# Patient Record
Sex: FEMALE | Race: WHITE | NOT HISPANIC OR LATINO | Employment: PART TIME | URBAN - METROPOLITAN AREA
[De-identification: names, ages, dates, MRNs, and addresses within clinical notes are randomized per-mention and may not be internally consistent; named-entity substitution may affect disease eponyms.]

---

## 2018-02-27 ENCOUNTER — OFFICE VISIT (OUTPATIENT)
Dept: URGENT CARE | Facility: CLINIC | Age: 68
End: 2018-02-27
Payer: MEDICARE

## 2018-02-27 VITALS
BODY MASS INDEX: 24.43 KG/M2 | HEART RATE: 90 BPM | WEIGHT: 146.61 LBS | HEIGHT: 65 IN | SYSTOLIC BLOOD PRESSURE: 146 MMHG | OXYGEN SATURATION: 97 % | RESPIRATION RATE: 16 BRPM | DIASTOLIC BLOOD PRESSURE: 80 MMHG | TEMPERATURE: 98.8 F

## 2018-02-27 DIAGNOSIS — R05.9 COUGH: ICD-10-CM

## 2018-02-27 DIAGNOSIS — Z87.19 HISTORY OF HIATAL HERNIA: ICD-10-CM

## 2018-02-27 DIAGNOSIS — K21.9 GASTROESOPHAGEAL REFLUX DISEASE, ESOPHAGITIS PRESENCE NOT SPECIFIED: ICD-10-CM

## 2018-02-27 DIAGNOSIS — Z87.09 HISTORY OF REACTIVE AIRWAY DISEASE: ICD-10-CM

## 2018-02-27 PROCEDURE — 99203 OFFICE O/P NEW LOW 30 MIN: CPT | Performed by: PHYSICIAN ASSISTANT

## 2018-02-27 RX ORDER — ALBUTEROL SULFATE 90 UG/1
2 AEROSOL, METERED RESPIRATORY (INHALATION) EVERY 6 HOURS PRN
Qty: 8.5 G | Refills: 0 | Status: SHIPPED | OUTPATIENT
Start: 2018-02-27

## 2018-03-03 ASSESSMENT — ENCOUNTER SYMPTOMS
FATIGUE: 0
SHORTNESS OF BREATH: 0
MYALGIAS: 1
SORE THROAT: 0
WEIGHT LOSS: 0
EYE REDNESS: 0
NECK PAIN: 0
HEARTBURN: 1
PALPITATIONS: 0
ABDOMINAL PAIN: 0
SPUTUM PRODUCTION: 0
DIARRHEA: 0
FEVER: 0
WHEEZING: 1
CHILLS: 1
EYE DISCHARGE: 0
HEADACHES: 0
NAUSEA: 0
COUGH: 1
VOMITING: 0
TINGLING: 0
ORTHOPNEA: 0
DIZZINESS: 0

## 2018-03-03 NOTE — PROGRESS NOTES
"Subjective:      Linda Montalvo is a 67 y.o. female who presents with Gastrophageal Reflux; Hernia; Shortness of Breath; Generalized Body Aches; and Cough ([id number], chest congestion)            Pt is 68 y/o female who presents today with recent dry cough, and chest congestion, reflux and intermittent body aches. Patient reports that her cough started 2 days ago however she is long history of reflux worsened by her hiatal hernia. Patient reports that she drink a significant amount of vitamin C and suspects that this is caused her worsening heartburn. She reports that this also makes her Cough a little worse.   She denies any fevers, however \"feels like she's getting sick\". She also reports hx of reactive airway- needing to be on rescue inhalers in the past- she currently does not have one.       Gastrophageal Reflux   She complains of coughing, heartburn and wheezing. She reports no abdominal pain, no chest pain, no nausea, no sore throat or no tooth decay. The symptoms are aggravated by certain foods, caffeine, medications and tight clothes. Pertinent negatives include no fatigue, orthopnea or weight loss. Risk factors include hiatal hernia. Past procedures include an EGD.       Review of Systems   Constitutional: Positive for chills and malaise/fatigue. Negative for fatigue, fever and weight loss.   HENT: Negative for congestion, ear discharge, ear pain and sore throat.    Eyes: Negative for discharge and redness.   Respiratory: Positive for cough and wheezing. Negative for sputum production and shortness of breath.    Cardiovascular: Negative for chest pain, palpitations and leg swelling.   Gastrointestinal: Positive for heartburn. Negative for abdominal pain, diarrhea, nausea and vomiting.   Genitourinary: Negative for dysuria and urgency.   Musculoskeletal: Positive for myalgias. Negative for neck pain.   Skin: Negative for itching and rash.   Neurological: Negative for dizziness, tingling and headaches. " "         Objective:     /80   Pulse 90   Temp 37.1 °C (98.8 °F)   Resp 16   Ht 1.651 m (5' 5\")   Wt 66.5 kg (146 lb 9.7 oz)   SpO2 97%   BMI 24.40 kg/m²    PMH:  has no past medical history on file.  MEDS:   Current Outpatient Prescriptions:   •  ALBUTEROL INH, Inhale  by mouth., Disp: , Rfl:   •  albuterol 108 (90 Base) MCG/ACT Aero Soln inhalation aerosol, Inhale 2 Puffs by mouth every 6 hours as needed for Shortness of Breath., Disp: 8.5 g, Rfl: 0  ALLERGIES:   Allergies   Allergen Reactions   • Botox [Botulinum Toxin Type A, Cosm] Unspecified     Acid reflux     SURGHX: No past surgical history on file.  SOCHX:  reports that she has never smoked. She has never used smokeless tobacco.  FH: Family history was reviewed, no pertinent findings to report    Physical Exam   Constitutional: She is oriented to person, place, and time. She appears well-developed and well-nourished.   HENT:   Head: Normocephalic and atraumatic.   Mouth/Throat: No oropharyngeal exudate.   Ears- Canals clear- TM- with clear fluid effusions bilaterally.   Pos. PND, with slight erythema- without tonsillar edema or exudate.   Mild discharge noted bilaterally- to nares.      Eyes: EOM are normal. Pupils are equal, round, and reactive to light.   Neck: Normal range of motion. Neck supple.   Cardiovascular: Normal rate and regular rhythm.    No murmur heard.  Pulmonary/Chest: Effort normal and breath sounds normal. No respiratory distress. She exhibits no tenderness.   Abdominal: Soft. Bowel sounds are normal. She exhibits no distension. There is no tenderness.   Musculoskeletal: Normal range of motion. She exhibits no tenderness.   Lymphadenopathy:     She has no cervical adenopathy.   Neurological: She is alert and oriented to person, place, and time.   Skin: Skin is warm. No rash noted.   Psychiatric: She has a normal mood and affect. Her behavior is normal.   Vitals reviewed.              Assessment/Plan:     1. History of hiatal " hernia  2. Gastroesophageal reflux disease, esophagitis presence not specified  3. Cough  - albuterol 108 (90 Base) MCG/ACT Aero Soln inhalation aerosol; Inhale 2 Puffs by mouth every 6 hours as needed for Shortness of Breath.  Dispense: 8.5 g; Refill: 0    4. History of reactive airway disease  - albuterol 108 (90 Base) MCG/ACT Aero Soln inhalation aerosol; Inhale 2 Puffs by mouth every 6 hours as needed for Shortness of Breath.  Dispense: 8.5 g; Refill: 0    Recheck after GI cocktail- small improvement of symptoms- Diet changes were discussed. It was explained to the pt. Today that due to the viral nature of the pt's illness, we will treat symptomatically today. Encouraged OTC supportive meds PRN. Humidification, increase fluids, avoid night time dairy.   Inhaler was written for this patient.   Patient given precautionary s/sx that mandate immediate follow up and evaluation in the ED. Advised of risks of not doing so.    DDX, Supportive care, and indications for immediate follow-up discussed with patient.    Instructed to return to clinic or nearest emergency department if we are not available for any change in condition, further concerns, or worsening of symptoms.    The patient demonstrated a good understanding and agreed with the treatment plan.

## 2018-03-18 ENCOUNTER — HOSPITAL ENCOUNTER (EMERGENCY)
Facility: MEDICAL CENTER | Age: 68
End: 2018-03-18
Attending: EMERGENCY MEDICINE
Payer: MEDICARE

## 2018-03-18 VITALS
SYSTOLIC BLOOD PRESSURE: 166 MMHG | HEIGHT: 65 IN | HEART RATE: 80 BPM | BODY MASS INDEX: 24.61 KG/M2 | WEIGHT: 147.71 LBS | OXYGEN SATURATION: 95 % | TEMPERATURE: 98.1 F | RESPIRATION RATE: 16 BRPM | DIASTOLIC BLOOD PRESSURE: 73 MMHG

## 2018-03-18 DIAGNOSIS — S05.01XA ABRASION OF RIGHT CORNEA, INITIAL ENCOUNTER: ICD-10-CM

## 2018-03-18 DIAGNOSIS — S05.02XA ABRASION OF LEFT CORNEA, INITIAL ENCOUNTER: ICD-10-CM

## 2018-03-18 PROCEDURE — 99283 EMERGENCY DEPT VISIT LOW MDM: CPT

## 2018-03-18 RX ORDER — PROPARACAINE HYDROCHLORIDE 5 MG/ML
SOLUTION/ DROPS OPHTHALMIC
Status: DISCONTINUED
Start: 2018-03-18 | End: 2018-03-18 | Stop reason: HOSPADM

## 2018-03-18 ASSESSMENT — PAIN SCALES - GENERAL: PAINLEVEL_OUTOF10: 10

## 2018-03-18 NOTE — ED PROVIDER NOTES
ED Provider Note    CHIEF COMPLAINT  Chief Complaint   Patient presents with   • Eye Pain     both eyes.        HPI  Linda Montalvo is a 67 y.o. female who presents to ER for evaluation of bilateral eye pain and irritation.  The patient states that her eyes felt fine yesterday.  She has been sleeping a lot of dust in her house because there are no remodeling.  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 that she had not worn or changes solution in in a year.  They were uncomfortable immediately, but she left them on for couple of hours.  They're somewhat afterwards missed today she woke up feeling a foreign body sensation of sand in her eyes.  Also, was of decreased vision.  Denies any previous eye pain or trauma.  No bright light exposures.  No other traumas or chemicals.  No other acute concerns or complaints.  Does not wear contacts normally.  No glaucoma.    REVIEW OF SYSTEMS  See HPI for further details. All other systems are negative.    PAST MEDICAL HISTORY  No past medical history on file.    FAMILY HISTORY  No family history on file.    SOCIAL HISTORY  Social History     Social History   • Marital status:      Spouse name: N/A   • Number of children: N/A   • Years of education: N/A     Social History Main Topics   • Smoking status: Never Smoker   • Smokeless tobacco: Never Used   • Alcohol use Not on file   • Drug use: Unknown   • Sexual activity: Not on file     Other Topics Concern   • Not on file     Social History Narrative   • No narrative on file       SURGICAL HISTORY  No past surgical history on file.    CURRENT MEDICATIONS  Home Medications     Reviewed by Carol Perera (Pharmacy Tech) on 03/18/18 at 0838  Med List Status: Complete   Medication Last Dose Status   albuterol 108 (90 Base) MCG/ACT Aero Soln inhalation aerosol 3/18/2018 Active   Non Formulary Request 3/17/2018 Active                ALLERGIES  Allergies   Allergen Reactions   • Botox [Botulinum Toxin  "Type A, Cosm] Anaphylaxis     Acid reflux       PHYSICAL EXAM  VITAL SIGNS: BP (!) 166/73   Pulse 80   Temp 36.7 °C (98.1 °F)   Resp 16   Ht 1.651 m (5' 5\")   Wt 67 kg (147 lb 11.3 oz)   SpO2 95%   BMI 24.58 kg/m²    Constitutional: Awake, alert, anxious, moderate distress from discomfort.  Eyes are closed and tearing  HENT: Normocephalic, Atraumatic, Bilateral external ears normal, Oropharynx moist, No oral exudates, Nose normal.   Eyes: PERRL, EOMI, both eyes are red and injected and angry appearing.  Anterior chambers are clear.  Tonometry was performed on both eyes and the pressure was 15 in each eye with less than 5% error.  4.  Seen exam was remarkable for stippling across both corneas without any dendritic or obvious abrasion just a diffuse irregular cornea.  Cornea showed the same thing under white light.  There is no positive hypopyon or hyphema.  Pupils are briskly reactive.  There is no foreign body visualized.  Visual acuity was difficult to obtain.  The patient had complete resolution of her pain.  Proparacaine could see the clock by them but could not read the numbers.  Neck: Normal range of motion, No tenderness, Supple, No stridor.   Lymphatic: No lymphadenopathy noted.   Cardiovascular: Normal heart rate, Normal rhythm, No murmurs, No rubs, No gallops.   Thorax & Lungs: Normal breath sounds, No respiratory distress, No wheezing,  Abdomen: Bowel sounds normal, Soft, No tenderness,    Musculoskeletal: Good range of motion in all major joints.   Neurologic: Alert & oriented x 3, No focal deficits noted.   Psychiatric: Affect normal,      COURSE & MEDICAL DECISION MAKING  Pertinent Labs & Imaging studies reviewed. (See chart for details)  The patient was seen and examined her eyes examined.  She had a detailed eye examination which shows a very diffuse irritation and irregularity to the cornea.  I suspect this is related to the contact lens use.    After her exam, I spoke with Dr. Perkins on-call " for ophthalmology who will see her in the office at this time.  The patient is given directions and the address and phone number for the ophthalmologist.  He has their contact and the patient is sent to see the ophthalmologist at this time.    The patient was noted to have elevated blood pressure while in the ER and was counseled to see their doctor within one wee to have this rechecked.    Ronni Enamorado M.D.  [address]  [address]  [facility] [address]  [phone]    Schedule an appointment as soon as possible for a visit          FINAL IMPRESSION  1. Abrasion of left cornea, initial encounter    2. Abrasion of right cornea, initial encounter          2.   3.         Electronically signed by: James Gary, 3/18/2018 9:21 AM

## 2018-03-18 NOTE — ED NOTES
Med rec updated and complete  Allergies reviewed  Pt reports no antibiotics in the last 30 days.

## 2018-03-18 NOTE — ED NOTES
Discharged in good condition with follow up instructions, pt verbalizes understanding of all, ambulates out with steady gait accompanied by .  Pt will go directly to eye Dr from ER.

## 2018-03-18 NOTE — ED NOTES
Patient is living in a house that has major remodeling occurring. There is a large amount of dust in house from drywall ect. Last night she put her contacts in and the pain became severe and vision was white and fuzzy.

## 2018-03-18 NOTE — DISCHARGE INSTRUCTIONS
Do not drive until cleared by your doctor.  Follow-up with ophthalmologist right now.        Corneal Abrasion  The cornea is the clear covering at the front and center of the eye. When you look at the colored portion of the eye, you are looking through the cornea. It is a thin tissue made up of layers. The top layer is the most sensitive layer. A corneal abrasion happens if this layer is scratched or an injury causes it to come off.   HOME CARE  · You may be given drops or a medicated cream. Use the medicine as told by your doctor.  · A pressure patch may be put over the eye. If this is done, follow your doctor's instructions for when to remove the patch. Do not drive or use machines while the eye patch is on. Judging distances is hard to do with a patch on.  · See your doctor for a follow-up exam if you are told to do so. It is very important that you keep this appointment.  GET HELP IF:   · You have pain, are sensitive to light, and have a scratchy feeling in one eye or both eyes.  · Your pressure patch keeps getting loose. You can blink your eye under the patch.  · You have fluid coming from your eye or the lids stick together in the morning.  · You have the same symptoms in the morning that you did with the first abrasion. This could be days, weeks, or months after the first abrasion healed.  This information is not intended to replace advice given to you by your health care provider. Make sure you discuss any questions you have with your health care provider.  Document Released: 06/05/2009 Document Revised: 09/07/2016 Document Reviewed: 08/25/2014  Elsevier Interactive Patient Education © 2017 [employer]

## 2018-11-15 ENCOUNTER — OFFICE VISIT (OUTPATIENT)
Dept: URGENT CARE | Facility: CLINIC | Age: 68
End: 2018-11-15
Payer: MEDICARE

## 2018-11-15 VITALS
TEMPERATURE: 97.4 F | HEART RATE: 86 BPM | HEIGHT: 65 IN | WEIGHT: 149.4 LBS | RESPIRATION RATE: 16 BRPM | OXYGEN SATURATION: 95 % | DIASTOLIC BLOOD PRESSURE: 80 MMHG | SYSTOLIC BLOOD PRESSURE: 120 MMHG | BODY MASS INDEX: 24.89 KG/M2

## 2018-11-15 DIAGNOSIS — J02.9 SORE THROAT: ICD-10-CM

## 2018-11-15 DIAGNOSIS — J06.9 UPPER RESPIRATORY TRACT INFECTION, UNSPECIFIED TYPE: ICD-10-CM

## 2018-11-15 DIAGNOSIS — J02.9 ACUTE PHARYNGITIS, UNSPECIFIED ETIOLOGY: Primary | ICD-10-CM

## 2018-11-15 LAB
INT CON NEG: NORMAL
INT CON POS: NORMAL
S PYO AG THROAT QL: NORMAL

## 2018-11-15 PROCEDURE — 87880 STREP A ASSAY W/OPTIC: CPT | Performed by: NURSE PRACTITIONER

## 2018-11-15 PROCEDURE — 99214 OFFICE O/P EST MOD 30 MIN: CPT | Performed by: NURSE PRACTITIONER

## 2018-11-15 RX ORDER — AZITHROMYCIN 250 MG/1
TABLET, FILM COATED ORAL
Qty: 6 TAB | Refills: 0 | Status: SHIPPED | OUTPATIENT
Start: 2018-11-15

## 2018-11-15 RX ORDER — METHYLPREDNISOLONE 4 MG/1
TABLET ORAL
Qty: 1 KIT | Refills: 0 | Status: SHIPPED | OUTPATIENT
Start: 2018-11-15 | End: 2022-02-15

## 2018-11-15 ASSESSMENT — ENCOUNTER SYMPTOMS
CHILLS: 1
HEADACHES: 0
SORE THROAT: 1
NEUROLOGICAL NEGATIVE: 1
COUGH: 1
SHORTNESS OF BREATH: 0
CARDIOVASCULAR NEGATIVE: 1
EYES NEGATIVE: 1

## 2018-11-16 NOTE — PROGRESS NOTES
Subjective:   Linda Montalvo is a 68 y.o. female who presents for Pharyngitis (x 2 days, sore throat, pain to swallow, ear pain and little chest tigthness)        Pharyngitis    This is a new problem. Episode onset: 2 days ago. The problem has been gradually worsening. Maximum temperature: reports feeling hot. The pain is severe. Associated symptoms include congestion, coughing and ear pain. Pertinent negatives include no headaches or shortness of breath. She has had exposure to strep.     Review of Systems   Constitutional: Positive for chills and malaise/fatigue.   HENT: Positive for congestion, ear pain and sore throat.    Eyes: Negative.    Respiratory: Positive for cough. Negative for shortness of breath.    Cardiovascular: Negative.  Negative for chest pain.   Neurological: Negative.  Negative for headaches.   All other systems reviewed and are negative.      PMH:  has no past medical history on file.    MEDS:   Current Outpatient Prescriptions:   •  azithromycin (ZITHROMAX) 250 MG Tab, Take 2 tabs by mouth once today, then one tab by mouth once daily days 2-5. Complete all medication., Disp: 6 Tab, Rfl: 0  •  MethylPREDNISolone (MEDROL DOSEPAK) 4 MG Tablet Therapy Pack, Take as directed on packaging., Disp: 1 Kit, Rfl: 0  •  albuterol 108 (90 Base) MCG/ACT Aero Soln inhalation aerosol, Inhale 2 Puffs by mouth every 6 hours as needed for Shortness of Breath., Disp: 8.5 g, Rfl: 0  •  Non Formulary Request, Apply 1 Application to affected area(s) every evening. Bio identical Progesterone Cream, Disp: , Rfl:     ALLERGIES:   Allergies   Allergen Reactions   • Botox [Botulinum Toxin Type A, Cosm] Anaphylaxis     Acid reflux     SURGHX: No past surgical history on file.    SOCHX:  reports that she has never smoked. She has never used smokeless tobacco.    FH: family history is not on file.     Objective:   /80 (BP Location: Left arm, Patient Position: Sitting, BP Cuff Size: Adult)   Pulse 86   Temp 36.3  "°C (97.4 °F) (Temporal)   Resp 16   Ht 1.651 m (5' 5\")   Wt 67.8 kg (149 lb 6.4 oz)   SpO2 95%   BMI 24.86 kg/m²      Physical Exam   Constitutional: She is oriented to person, place, and time. She appears well-developed and well-nourished.   HENT:   Head: Normocephalic and atraumatic.   Right Ear: Tympanic membrane and external ear normal.   Left Ear: Tympanic membrane and external ear normal.   Nose: Nose normal. Right sinus exhibits no maxillary sinus tenderness and no frontal sinus tenderness. Left sinus exhibits no maxillary sinus tenderness and no frontal sinus tenderness.   Mouth/Throat: Uvula is midline and mucous membranes are normal. Posterior oropharyngeal edema and posterior oropharyngeal erythema present. No oropharyngeal exudate or tonsillar abscesses. Tonsils are 2+ on the right. Tonsils are 2+ on the left. No tonsillar exudate.   Eyes: Pupils are equal, round, and reactive to light. Conjunctivae and EOM are normal.   Neck: Normal range of motion.   Cardiovascular: Normal rate, regular rhythm, normal heart sounds and intact distal pulses.    Pulmonary/Chest: Effort normal and breath sounds normal. No respiratory distress. She has no decreased breath sounds. She has no wheezes. She has no rhonchi. She has no rales.   Abdominal: Soft. Bowel sounds are normal.   Musculoskeletal: Normal range of motion.   Lymphadenopathy:     She has no cervical adenopathy.   Neurological: She is alert and oriented to person, place, and time.   Skin: Skin is warm and dry. Capillary refill takes less than 2 seconds.   Psychiatric: She has a normal mood and affect.   Vitals reviewed.        Assessment/Plan:   1. Acute pharyngitis, unspecified etiology  - azithromycin (ZITHROMAX) 250 MG Tab; Take 2 tabs by mouth once today, then one tab by mouth once daily days 2-5. Complete all medication.  Dispense: 6 Tab; Refill: 0  - MethylPREDNISolone (MEDROL DOSEPAK) 4 MG Tablet Therapy Pack; Take as directed on packaging.  " Dispense: 1 Kit; Refill: 0    2. Upper respiratory tract infection, unspecified type  - azithromycin (ZITHROMAX) 250 MG Tab; Take 2 tabs by mouth once today, then one tab by mouth once daily days 2-5. Complete all medication.  Dispense: 6 Tab; Refill: 0    3. Sore throat  - POCT Rapid Strep A    Rapid strep swab negative, but patient reports her  was recently diagnosed with strep throat and is she concerned about a worsening upper respiratory illness because she is about to travel. Differential diagnosis, natural history, supportive care, and indications for immediate follow-up discussed.    Return for 1) Symptoms that change or worsen, or go to the ER, 2) Follow up with primary care.    All questions answered. Patient agrees with the plan of care.

## 2019-05-01 ENCOUNTER — OFFICE VISIT (OUTPATIENT)
Dept: URGENT CARE | Facility: CLINIC | Age: 69
End: 2019-05-01
Payer: MEDICARE

## 2019-05-01 VITALS
SYSTOLIC BLOOD PRESSURE: 122 MMHG | TEMPERATURE: 98.4 F | DIASTOLIC BLOOD PRESSURE: 60 MMHG | WEIGHT: 136 LBS | OXYGEN SATURATION: 98 % | BODY MASS INDEX: 21.86 KG/M2 | HEIGHT: 66 IN | HEART RATE: 74 BPM | RESPIRATION RATE: 16 BRPM

## 2019-05-01 DIAGNOSIS — J20.9 ACUTE BRONCHITIS, UNSPECIFIED ORGANISM: ICD-10-CM

## 2019-05-01 PROCEDURE — 99214 OFFICE O/P EST MOD 30 MIN: CPT | Performed by: NURSE PRACTITIONER

## 2019-05-01 RX ORDER — DOXYCYCLINE HYCLATE 100 MG
100 TABLET ORAL 2 TIMES DAILY
Qty: 14 TAB | Refills: 0 | Status: SHIPPED | OUTPATIENT
Start: 2019-05-01 | End: 2019-05-08

## 2019-05-01 ASSESSMENT — ENCOUNTER SYMPTOMS
CHILLS: 0
EYE DISCHARGE: 0
DIARRHEA: 0
HEADACHES: 1
SPUTUM PRODUCTION: 1
SHORTNESS OF BREATH: 0
WHEEZING: 0
MYALGIAS: 0
ORTHOPNEA: 0
NAUSEA: 0
FEVER: 0
SORE THROAT: 0
COUGH: 1

## 2019-05-01 NOTE — PROGRESS NOTES
Subjective:      Linda Montalvo is a 68 y.o. female who presents with Cough (x2weeks, cough, chest congestion, hard to breathe, ear congestion, headache)            HPI New. 68 year old female with cough and chdest congestion for 2 weeks. She has associated ear congestion, headache and shortness of breath. She denies any wheezing. She has no fever, chills, sore throat or body aches. Taking her albuterol (GERD) and otc medications.  Botox [botulinum toxin type a, cosm]  Current Outpatient Prescriptions on File Prior to Visit   Medication Sig Dispense Refill   • albuterol 108 (90 Base) MCG/ACT Aero Soln inhalation aerosol Inhale 2 Puffs by mouth every 6 hours as needed for Shortness of Breath. 8.5 g 0   • azithromycin (ZITHROMAX) 250 MG Tab Take 2 tabs by mouth once today, then one tab by mouth once daily days 2-5. Complete all medication. (Patient not taking: Reported on 5/1/2019) 6 Tab 0   • MethylPREDNISolone (MEDROL DOSEPAK) 4 MG Tablet Therapy Pack Take as directed on packaging. (Patient not taking: Reported on 5/1/2019) 1 Kit 0   • Non Formulary Request Apply 1 Application to affected area(s) every evening. Bio identical Progesterone Cream       No current facility-administered medications on file prior to visit.      Social History     Social History   • Marital status:      Spouse name: N/A   • Number of children: N/A   • Years of education: N/A     Occupational History   • Not on file.     Social History Main Topics   • Smoking status: Never Smoker   • Smokeless tobacco: Never Used   • Alcohol use Not on file   • Drug use: Unknown   • Sexual activity: Not on file     Other Topics Concern   • Not on file     Social History Narrative   • No narrative on file     family history is not on file.      Review of Systems   Constitutional: Positive for malaise/fatigue. Negative for chills and fever.   HENT: Positive for ear pain. Negative for congestion and sore throat.    Eyes: Negative for discharge.  "  Respiratory: Positive for cough and sputum production. Negative for shortness of breath and wheezing.    Cardiovascular: Negative for chest pain and orthopnea.   Gastrointestinal: Negative for diarrhea and nausea.   Musculoskeletal: Negative for myalgias.   Neurological: Positive for headaches.   Endo/Heme/Allergies: Negative for environmental allergies.          Objective:     /60 (BP Location: Left arm, Patient Position: Sitting, BP Cuff Size: Adult)   Pulse 74   Temp 36.9 °C (98.4 °F) (Temporal)   Resp 16   Ht 1.664 m (5' 5.5\")   Wt 61.7 kg (136 lb)   SpO2 98%   BMI 22.29 kg/m²      Physical Exam   Constitutional: She is oriented to person, place, and time. She appears well-developed and well-nourished. No distress.   HENT:   Head: Normocephalic and atraumatic.   Right Ear: External ear and ear canal normal. Tympanic membrane is not injected and not perforated. No middle ear effusion.   Left Ear: External ear and ear canal normal. Tympanic membrane is not injected and not perforated.  No middle ear effusion.   Nose: No mucosal edema.   Mouth/Throat: No oropharyngeal exudate or posterior oropharyngeal erythema.   Eyes: Conjunctivae are normal. Right eye exhibits no discharge. Left eye exhibits no discharge.   Neck: Normal range of motion. Neck supple.   Cardiovascular: Normal rate, regular rhythm and normal heart sounds.    No murmur heard.  Pulmonary/Chest: Effort normal and breath sounds normal. No respiratory distress. She has no wheezes. She has no rales.   Musculoskeletal: Normal range of motion.   Normal movement of all 4 extremities.   Lymphadenopathy:     She has no cervical adenopathy.        Right: No supraclavicular adenopathy present.        Left: No supraclavicular adenopathy present.   Neurological: She is alert and oriented to person, place, and time. Gait normal.   Skin: Skin is warm and dry.   Psychiatric: She has a normal mood and affect. Her behavior is normal. Thought content " normal.   Nursing note and vitals reviewed.              Assessment/Plan:     1. Acute bronchitis, unspecified organism  doxycycline (VIBRAMYCIN) 100 MG Tab     Differential diagnosis, natural history, supportive care, and indications for immediate follow-up discussed at length.

## 2020-11-12 ENCOUNTER — APPOINTMENT (OUTPATIENT)
Dept: RADIOLOGY | Facility: MEDICAL CENTER | Age: 70
End: 2020-11-12
Payer: MEDICARE

## 2020-11-12 ENCOUNTER — HOSPITAL ENCOUNTER (EMERGENCY)
Facility: MEDICAL CENTER | Age: 70
End: 2020-11-12
Attending: EMERGENCY MEDICINE
Payer: MEDICARE

## 2020-11-12 VITALS
WEIGHT: 150.79 LBS | BODY MASS INDEX: 25.12 KG/M2 | SYSTOLIC BLOOD PRESSURE: 127 MMHG | OXYGEN SATURATION: 97 % | HEART RATE: 71 BPM | TEMPERATURE: 98.4 F | DIASTOLIC BLOOD PRESSURE: 71 MMHG | HEIGHT: 65 IN | RESPIRATION RATE: 16 BRPM

## 2020-11-12 DIAGNOSIS — R07.9 CHEST PAIN AT REST: ICD-10-CM

## 2020-11-12 LAB
ALBUMIN SERPL BCP-MCNC: 4 G/DL (ref 3.2–4.9)
ALBUMIN/GLOB SERPL: 1.3 G/DL
ALP SERPL-CCNC: 60 U/L (ref 30–99)
ALT SERPL-CCNC: 21 U/L (ref 2–50)
ANION GAP SERPL CALC-SCNC: 11 MMOL/L (ref 7–16)
AST SERPL-CCNC: 22 U/L (ref 12–45)
BASOPHILS # BLD AUTO: 0.5 % (ref 0–1.8)
BASOPHILS # BLD: 0.03 K/UL (ref 0–0.12)
BILIRUB SERPL-MCNC: 0.4 MG/DL (ref 0.1–1.5)
BUN SERPL-MCNC: 16 MG/DL (ref 8–22)
CALCIUM SERPL-MCNC: 9.1 MG/DL (ref 8.4–10.2)
CHLORIDE SERPL-SCNC: 105 MMOL/L (ref 96–112)
CO2 SERPL-SCNC: 25 MMOL/L (ref 20–33)
CREAT SERPL-MCNC: 0.85 MG/DL (ref 0.5–1.4)
EKG IMPRESSION: NORMAL
EOSINOPHIL # BLD AUTO: 0.04 K/UL (ref 0–0.51)
EOSINOPHIL NFR BLD: 0.7 % (ref 0–6.9)
ERYTHROCYTE [DISTWIDTH] IN BLOOD BY AUTOMATED COUNT: 43 FL (ref 35.9–50)
GLOBULIN SER CALC-MCNC: 3.1 G/DL (ref 1.9–3.5)
GLUCOSE SERPL-MCNC: 98 MG/DL (ref 65–99)
HCT VFR BLD AUTO: 43.8 % (ref 37–47)
HGB BLD-MCNC: 14.5 G/DL (ref 12–16)
IMM GRANULOCYTES # BLD AUTO: 0.01 K/UL (ref 0–0.11)
IMM GRANULOCYTES NFR BLD AUTO: 0.2 % (ref 0–0.9)
LYMPHOCYTES # BLD AUTO: 1.4 K/UL (ref 1–4.8)
LYMPHOCYTES NFR BLD: 24.9 % (ref 22–41)
MCH RBC QN AUTO: 30.1 PG (ref 27–33)
MCHC RBC AUTO-ENTMCNC: 33.1 G/DL (ref 33.6–35)
MCV RBC AUTO: 90.9 FL (ref 81.4–97.8)
MONOCYTES # BLD AUTO: 0.41 K/UL (ref 0–0.85)
MONOCYTES NFR BLD AUTO: 7.3 % (ref 0–13.4)
NEUTROPHILS # BLD AUTO: 3.73 K/UL (ref 2–7.15)
NEUTROPHILS NFR BLD: 66.4 % (ref 44–72)
NRBC # BLD AUTO: 0 K/UL
NRBC BLD-RTO: 0 /100 WBC
PLATELET # BLD AUTO: 222 K/UL (ref 164–446)
PMV BLD AUTO: 10.8 FL (ref 9–12.9)
POTASSIUM SERPL-SCNC: 4.6 MMOL/L (ref 3.6–5.5)
PROT SERPL-MCNC: 7.1 G/DL (ref 6–8.2)
RBC # BLD AUTO: 4.82 M/UL (ref 4.2–5.4)
SODIUM SERPL-SCNC: 141 MMOL/L (ref 135–145)
T4 FREE SERPL-MCNC: 1.12 NG/DL (ref 0.93–1.7)
TROPONIN T SERPL-MCNC: 8 NG/L (ref 6–19)
TSH SERPL DL<=0.005 MIU/L-ACNC: 1.17 UIU/ML (ref 0.38–5.33)
WBC # BLD AUTO: 5.6 K/UL (ref 4.8–10.8)

## 2020-11-12 PROCEDURE — 84439 ASSAY OF FREE THYROXINE: CPT

## 2020-11-12 PROCEDURE — 93005 ELECTROCARDIOGRAM TRACING: CPT

## 2020-11-12 PROCEDURE — 84443 ASSAY THYROID STIM HORMONE: CPT

## 2020-11-12 PROCEDURE — 99283 EMERGENCY DEPT VISIT LOW MDM: CPT

## 2020-11-12 PROCEDURE — 85025 COMPLETE CBC W/AUTO DIFF WBC: CPT

## 2020-11-12 PROCEDURE — 71045 X-RAY EXAM CHEST 1 VIEW: CPT

## 2020-11-12 PROCEDURE — 84484 ASSAY OF TROPONIN QUANT: CPT

## 2020-11-12 PROCEDURE — 93005 ELECTROCARDIOGRAM TRACING: CPT | Performed by: EMERGENCY MEDICINE

## 2020-11-12 PROCEDURE — 80053 COMPREHEN METABOLIC PANEL: CPT

## 2020-11-12 NOTE — ED PROVIDER NOTES
"ED Provider Note    CHIEF COMPLAINT  Chief Complaint   Patient presents with   • Sent by MD     Reports chest heaviness and \"tightness in my back\". Reports hx of GERD. Sent here by PCP \"so that I can go to endoscopy next week.        HPI  Linda Montalvo is a 70 y.o. female who presents with a report of chest heaviness and tightness over the last couple of days.  She says that she is going to endoscopy next week.  She is actually had some chest discomfort for many weeks off and on and her doctor thinks she may have gastroesophageal reflux disease.  She states that she has not had any sweats or vomiting or radiation of the pain that seems to be well localized but dull and achy.  Does not radiate to the back.  Denies any leg pain or swelling or prior history of clots.  Denies any fever, chills, sweats or other complaints    REVIEW OF SYSTEMS  See HPI for further details. All other systems are negative.     PAST MEDICAL HISTORY  Past Medical History:   Diagnosis Date   • GERD (gastroesophageal reflux disease)        FAMILY HISTORY  No family history on file.    SOCIAL HISTORY   reports that she has never smoked. She has never used smokeless tobacco.    SURGICAL HISTORY  Past Surgical History:   Procedure Laterality Date   • BREAST RECONSTRUCTION     • PRIMARY C SECTION         CURRENT MEDICATIONS  Home Medications    **Home medications have not yet been reviewed for this encounter**         ALLERGIES  Allergies   Allergen Reactions   • Botox [Botulinum Toxin Type A, Cosm] Anaphylaxis     Acid reflux       PHYSICAL EXAM  VITAL SIGNS: /66   Pulse 74   Temp 37.1 °C (98.8 °F) (Temporal)   Resp 18   Ht 1.651 m (5' 5\")   Wt 68.4 kg (150 lb 12.7 oz)   SpO2 98%   BMI 25.09 kg/m²    Constitutional: Well developed, Well nourished, No acute distress, Non-toxic appearance.   HENT: Normocephalic, Atraumatic, Bilateral external ears normal, Oropharynx is clear mucous membranes are moist. No oral exudates or nasal " discharge.   Eyes: Pupils are equal round and reactive, EOMI, Conjunctiva normal, No discharge.   Neck: Normal range of motion, No tenderness, Supple, No stridor. No meningismus.  Lymphatic: No lymphadenopathy noted.   Cardiovascular: Regular rate and rhythm without murmur rub or gallop.  Thorax & Lungs: Clear breath sounds bilaterally without wheezes, rhonchi or rales. There is no chest wall tenderness.   Abdomen: Soft non-tender non-distended. There is no rebound or guarding. No organomegaly is appreciated. Bowel sounds are normal.  Skin: Normal without rash.   Back: No CVA or spinal tenderness.   Extremities: Intact distal pulses, No edema, No tenderness, No cyanosis, No clubbing. Capillary refill is less than 2 seconds.  Musculoskeletal: Good range of motion in all major joints. No tenderness to palpation or major deformities noted.   Neurologic: Alert & oriented x 3, Normal motor function, Normal sensory function, No focal deficits noted. Reflexes are normal.  Psychiatric: Affect normal, Judgment normal, Mood normal. There is no suicidal ideation or patient reported hallucinations.     EKG  Results for orders placed or performed during the hospital encounter of 20   EKG   Result Value Ref Range    Report       [facility] Emergency Dept.    Test Date:  2020  Pt Name:    FELICE DIAMOND             Department: EDS  MRN:        [id number]                      Room:  Gender:     Female                       Technician: ARNIE  :        1950                   Requested By:ER TRIAGE PROTOCOL  Order #:    [id number]                    Reading MD: MILAGRO SCHWARTZ MD    Measurements  Intervals                                Axis  Rate:       74                           P:          66  NC:         184                          QRS:        66  QRSD:       86                           T:          58  QT:         392  QTc:        435    Interpretive Statements  SINUS RHYTHM  LOW VOLTAGE  IN FRONTAL LEADS  No previous ECG available for comparison  Electronically Signed On 11- 13:32:28 PST by MILAGRO SCHWARTZ MD           RADIOLOGY/PROCEDURES  DX-CHEST-PORTABLE (1 VIEW)   Final Result      No radiographic evidence of acute cardiopulmonary process.            COURSE & MEDICAL DECISION MAKING  Pertinent Labs & Imaging studies reviewed. (See chart for details)  The patient had an EKG performed that shows no ischemic changes or dysrhythmia.    I have a low sense of coronavirus and did not perform screening test.  I am concerned about ACS versus esophagitis or GERD.  Less likely aortic abnormality.    Chest x-ray shows no acute mass, edema, cardiomegaly    Laboratory evaluation shows no leukocytosis, shift, anemia, electrolyte derangements or acidosis and troponin is normal and has good negative predictive value.  Heart score is 1    The patient has a very low risk of ACS and I am discharging her with follow-up to her primary doctor.  I have added TSH and free T4 to her lab regimen which is pending.  She has had some weight gain of 20 pounds and malaise and she can follow-up with testing with her doctor and be started on medication as appropriate.    Patient is discharged in stable condition understands her plan of care will return if any significant change in symptoms    FINAL IMPRESSION  1. Chest pain at rest             Electronically signed by: Milagro Schwartz M.D., 11/12/2020 1:28 PM

## 2021-03-03 ENCOUNTER — PHARMACY VISIT (OUTPATIENT)
Dept: PHARMACY | Facility: MEDICAL CENTER | Age: 71
End: 2021-03-03
Payer: COMMERCIAL

## 2021-03-03 PROCEDURE — RXMED WILLOW AMBULATORY MEDICATION CHARGE: Performed by: SURGERY

## 2021-03-03 RX ORDER — OXYCODONE HCL 5 MG/5 ML
SOLUTION, ORAL ORAL
Qty: 120 ML | Refills: 0 | OUTPATIENT
Start: 2021-03-03

## 2022-02-15 ENCOUNTER — HOSPITAL ENCOUNTER (EMERGENCY)
Facility: MEDICAL CENTER | Age: 72
End: 2022-02-15
Attending: EMERGENCY MEDICINE
Payer: MEDICARE

## 2022-02-15 VITALS
OXYGEN SATURATION: 96 % | WEIGHT: 145.72 LBS | HEART RATE: 67 BPM | RESPIRATION RATE: 16 BRPM | DIASTOLIC BLOOD PRESSURE: 69 MMHG | HEIGHT: 65 IN | SYSTOLIC BLOOD PRESSURE: 165 MMHG | TEMPERATURE: 97.8 F | BODY MASS INDEX: 24.28 KG/M2

## 2022-02-15 DIAGNOSIS — R21 RASH: ICD-10-CM

## 2022-02-15 LAB — TREPONEMA PALLIDUM IGG+IGM AB [PRESENCE] IN SERUM OR PLASMA BY IMMUNOASSAY: NORMAL

## 2022-02-15 PROCEDURE — 99283 EMERGENCY DEPT VISIT LOW MDM: CPT

## 2022-02-15 PROCEDURE — 86780 TREPONEMA PALLIDUM: CPT

## 2022-02-15 PROCEDURE — 36415 COLL VENOUS BLD VENIPUNCTURE: CPT

## 2022-02-15 RX ORDER — DEXAMETHASONE SODIUM PHOSPHATE 4 MG/ML
10 INJECTION, SOLUTION INTRA-ARTICULAR; INTRALESIONAL; INTRAMUSCULAR; INTRAVENOUS; SOFT TISSUE ONCE
Status: COMPLETED | OUTPATIENT
Start: 2022-02-15 | End: 2022-02-15

## 2022-02-15 RX ORDER — METHYLPREDNISOLONE 4 MG/1
TABLET ORAL
Qty: 1 EACH | Refills: 0 | Status: SHIPPED | OUTPATIENT
Start: 2022-02-15

## 2022-02-15 RX ORDER — HYDROXYZINE PAMOATE 25 MG/1
25 CAPSULE ORAL 3 TIMES DAILY PRN
Qty: 60 CAPSULE | Refills: 0 | Status: SHIPPED | OUTPATIENT
Start: 2022-02-15

## 2022-02-15 ASSESSMENT — FIBROSIS 4 INDEX: FIB4 SCORE: 1.49

## 2022-02-15 NOTE — ED TRIAGE NOTES
"Chief Complaint   Patient presents with   • Rash     Pt c/o full body rash for three months, states has followed up w/ Dermatologist and has tried several different treatments without relief     BP (!) 164/77   Pulse 90   Temp 36.5 °C (97.7 °F) (Temporal)   Resp 15   Ht 1.651 m (5' 5\")   Wt 66.1 kg (145 lb 11.6 oz)   SpO2 96%   BMI 24.25 kg/m²     Has this patient been vaccinated for COVID NO  If not, would they like to be vaccinated while in the ER if eligible?  no  Would the patient like to speak with the ERP about the possibility of receiving the COVID vaccine today before making a decision? No    Pt ambulated to ED w/ visitor for c/o generalized rash ongoing for three months.  Pt states is no longer able to handle the itching, states medications are not helping.    "

## 2022-02-15 NOTE — ED NOTES
Pt in gown, sitting on gurney.  Pt reports ongoing rash.  Pt has been seen by her PCP and a dermatologist.  Pt has copies of outpatient labs to share with ERP.  Pt reports pain 10/10.  Chart up for ERP.

## 2022-02-15 NOTE — DISCHARGE INSTRUCTIONS
Fortunately the exact cause of your rash is unclear.  I would continue with the medications written by your dermatologist and regular doctor.  Lets add the steroids.  The Vistaril is for itching but will make you drowsy.  Do not drive and take this medication.  If you develop a fever, have any change in the rash or any other concerns return.  I hope you feel better soon.

## 2022-02-15 NOTE — ED NOTES
Vital signs repeated.  Pt requesting initial dose of steroids to be given as an injection.  ERP made aware.

## 2022-02-15 NOTE — ED PROVIDER NOTES
ED Provider Note  CHIEF COMPLAINT  Chief Complaint   Patient presents with   • Rash     Pt c/o full body rash for three months, states has followed up w/ Dermatologist and has tried several different treatments without relief       HPI  Linda Montalvo is a 71 y.o. female who presents with a skin rash.  Patient has had a full body skin rash for the last 3 months.  Patient has been seen by her primary care doctor as well as a dermatologist.  She has had a work-up for a immune issue as well as skin biopsies.  Patient is currently taking ivermectin, doxycycline and triamcinolone cream without relief of her symptoms.  She presents today because she had discussed trying steroids with her primary care doctor and would like to start the steroids today.  Patient does not have any lesions in her mouth.  No lesions to her palms.  The rash did not start after travel.  The rash is extremely itchy.  It involves her scalp back and extremities.  She has no tongue swelling or difficulty breathing.  She has not had a recent illness but specialist think this is viral in etiology.  She not had any recent change in the rash.    REVIEW OF SYSTEMS  Positive for skin rash and itching, Negative for lesions in the mouth, tongue swelling, shortness of breath, fevers, swelling in the extremities, numbness coolness or weakness, nausea or vomiting.    PAST MEDICAL HISTORY   has a past medical history of Chronic back pain, COVID, and GERD (gastroesophageal reflux disease).    SOCIAL HISTORY  Social History     Tobacco Use   • Smoking status: Never Smoker   • Smokeless tobacco: Never Used   Vaping Use   • Vaping Use: Unknown   Substance and Sexual Activity   • Alcohol use: Yes   • Drug use: Not Currently   • Sexual activity: Not on file       SURGICAL HISTORY   has a past surgical history that includes primary c section and breast reconstruction.    CURRENT MEDICATIONS  Reviewed.  See Encounter Summary.      ALLERGIES  Allergies   Allergen  "Reactions   • Botox [Botulinum Toxin Type A, Cosm] Anaphylaxis     Acid reflux       PHYSICAL EXAM  VITAL SIGNS: BP (!) 164/77   Pulse 90   Temp 36.5 °C (97.7 °F) (Temporal)   Resp 15   Ht 1.651 m (5' 5\")   Wt 66.1 kg (145 lb 11.6 oz)   SpO2 96%   BMI 24.25 kg/m²   Constitutional: Alert in no apparent distress.  HENT: Normocephalic, Bilateral external ears normal. Nose normal.   Eyes: Pupils are equal and reactive. Conjunctiva normal, non-icteric.   Thorax & Lungs: Easy unlabored respirations  Abdomen:  No gross signs of peritonitis, no pain with movement   Skin: Visualized skin is  Dry, No erythema, there is a diffuse rash that is macular papular and erythematous to the extremities and back.  There is no scaling.  There is no vesicles.  There is no petechiae.  There is no involvement of the mucosa or palms or feet.  No evidence of a secondary infection.  Extremities:   No edema, No asymmetry  Neurologic: Alert, Grossly non-focal.   Psychiatric: Affect and Mood normal      COURSE & MEDICAL DECISION MAKING  Nursing notes and vital signs were reviewed. (See chart for details)    The patient presents to the Emergency Department with a skin rash of unclear etiology.  Has had skin biopsies and a work-up for rheumatoid etiology without diagnosis.  No involvement of the palms or mucosal surfaces.  Since the rash has been present for 3 months I am not concerned about an acute life-threatening skin rash.  I will check a syphilis test although I think likelihood is remote.  I will call her with those results if they are positive.  She requests steroids and I think this is a good option to try.  She not having any airway issues.  She will follow up with her primary care doctor.  I have also written for Vistaril to help her sleep at night and for the itching.  Skin rash precautions were given.         The patient verbally agreed to the discharge precautions and follow-up plan which is documented in EPIC.    FINAL " IMPRESSION  1. Rash  methylPREDNISolone (MEDROL DOSEPAK) 4 MG Tablet Therapy Pack    hydrOXYzine pamoate (VISTARIL) 25 MG Cap

## 2022-02-15 NOTE — ED NOTES
Discharge instructions given to pt with verbalized understanding. Pt ambulatory out of the ER with her .

## 2022-02-28 ENCOUNTER — APPOINTMENT (RX ONLY)
Dept: URBAN - METROPOLITAN AREA CLINIC 6 | Facility: CLINIC | Age: 72
Setting detail: DERMATOLOGY
End: 2022-02-28

## 2022-02-28 DIAGNOSIS — L30.9 DERMATITIS, UNSPECIFIED: ICD-10-CM

## 2022-02-28 PROCEDURE — ? PRESCRIPTION

## 2022-02-28 PROCEDURE — 99204 OFFICE O/P NEW MOD 45 MIN: CPT

## 2022-02-28 PROCEDURE — ? PHOTO-DOCUMENTATION

## 2022-02-28 PROCEDURE — ? DIAGNOSIS COMMENT

## 2022-02-28 PROCEDURE — ? COUNSELING

## 2022-02-28 RX ORDER — TRIAMCINOLONE ACETONIDE 1 MG/G
1 CREAM TOPICAL BID
Qty: 454 | Refills: 2 | Status: ERX | COMMUNITY
Start: 2022-02-28

## 2022-02-28 RX ADMIN — TRIAMCINOLONE ACETONIDE 1: 1 CREAM TOPICAL at 00:00

## 2022-02-28 ASSESSMENT — LOCATION DETAILED DESCRIPTION DERM: LOCATION DETAILED: LEFT SUPERIOR UPPER BACK

## 2022-02-28 ASSESSMENT — LOCATION SIMPLE DESCRIPTION DERM: LOCATION SIMPLE: LEFT UPPER BACK

## 2022-02-28 ASSESSMENT — LOCATION ZONE DERM: LOCATION ZONE: TRUNK

## 2022-02-28 NOTE — HPI: RASH
What Type Of Note Output Would You Prefer (Optional)?: Bullet Format
Is The Patient Presenting As Previously Scheduled?: Yes
How Severe Is Your Rash?: moderate
Is This A New Presentation, Or A Follow-Up?: Rash
Additional History: New patient. Patient reports 3 month history of itchy rash that started in her scalp and spread down the back, now present on her abdomen, arms, and legs. She had extensive work up by Dr. Otto (see scanned attachments). Her PCP started her on an oral steroid taper 2 weeks ago and she’s seeing some improvement with the rash. She is also taking hydroxyzine for itching without significant relief.

## 2022-02-28 NOTE — PROCEDURE: DIAGNOSIS COMMENT
Render Risk Assessment In Note?: no
Detail Level: Simple
Comment: Biopsy results showed “hypersensitivity reaction”. DIF negative. Recommended patch testing. Encouraged to continue steroid taper until completed and use topical triamcinolone and moisturizers as needed.

## 2022-03-23 ENCOUNTER — HOSPITAL ENCOUNTER (EMERGENCY)
Facility: MEDICAL CENTER | Age: 72
End: 2022-03-23
Attending: EMERGENCY MEDICINE
Payer: MEDICARE

## 2022-03-23 ENCOUNTER — APPOINTMENT (OUTPATIENT)
Dept: RADIOLOGY | Facility: MEDICAL CENTER | Age: 72
End: 2022-03-23
Attending: EMERGENCY MEDICINE
Payer: MEDICARE

## 2022-03-23 VITALS
TEMPERATURE: 97.5 F | DIASTOLIC BLOOD PRESSURE: 87 MMHG | SYSTOLIC BLOOD PRESSURE: 176 MMHG | OXYGEN SATURATION: 97 % | RESPIRATION RATE: 16 BRPM | HEART RATE: 66 BPM

## 2022-03-23 DIAGNOSIS — R21 RASH: ICD-10-CM

## 2022-03-23 DIAGNOSIS — R06.00 DYSPNEA, UNSPECIFIED TYPE: ICD-10-CM

## 2022-03-23 LAB
ALBUMIN SERPL BCP-MCNC: 3.9 G/DL (ref 3.2–4.9)
ALBUMIN/GLOB SERPL: 2 G/DL
ALP SERPL-CCNC: 41 U/L (ref 30–99)
ALT SERPL-CCNC: 41 U/L (ref 2–50)
ANION GAP SERPL CALC-SCNC: 13 MMOL/L (ref 7–16)
AST SERPL-CCNC: 36 U/L (ref 12–45)
BASOPHILS # BLD AUTO: 0.1 % (ref 0–1.8)
BASOPHILS # BLD: 0.01 K/UL (ref 0–0.12)
BILIRUB SERPL-MCNC: 0.3 MG/DL (ref 0.1–1.5)
BUN SERPL-MCNC: 14 MG/DL (ref 8–22)
CALCIUM SERPL-MCNC: 8.6 MG/DL (ref 8.4–10.2)
CHLORIDE SERPL-SCNC: 102 MMOL/L (ref 96–112)
CO2 SERPL-SCNC: 23 MMOL/L (ref 20–33)
CREAT SERPL-MCNC: 0.7 MG/DL (ref 0.5–1.4)
EKG IMPRESSION: NORMAL
EOSINOPHIL # BLD AUTO: 0.02 K/UL (ref 0–0.51)
EOSINOPHIL NFR BLD: 0.3 % (ref 0–6.9)
ERYTHROCYTE [DISTWIDTH] IN BLOOD BY AUTOMATED COUNT: 46.8 FL (ref 35.9–50)
GFR SERPLBLD CREATININE-BSD FMLA CKD-EPI: 93 ML/MIN/1.73 M 2
GLOBULIN SER CALC-MCNC: 2 G/DL (ref 1.9–3.5)
GLUCOSE SERPL-MCNC: 109 MG/DL (ref 65–99)
HCT VFR BLD AUTO: 41 % (ref 37–47)
HGB BLD-MCNC: 13.4 G/DL (ref 12–16)
IMM GRANULOCYTES # BLD AUTO: 0.09 K/UL (ref 0–0.11)
IMM GRANULOCYTES NFR BLD AUTO: 1.2 % (ref 0–0.9)
LYMPHOCYTES # BLD AUTO: 0.78 K/UL (ref 1–4.8)
LYMPHOCYTES NFR BLD: 10 % (ref 22–41)
MCH RBC QN AUTO: 30.6 PG (ref 27–33)
MCHC RBC AUTO-ENTMCNC: 32.7 G/DL (ref 33.6–35)
MCV RBC AUTO: 93.6 FL (ref 81.4–97.8)
MONOCYTES # BLD AUTO: 0.43 K/UL (ref 0–0.85)
MONOCYTES NFR BLD AUTO: 5.5 % (ref 0–13.4)
NEUTROPHILS # BLD AUTO: 6.44 K/UL (ref 2–7.15)
NEUTROPHILS NFR BLD: 82.9 % (ref 44–72)
NRBC # BLD AUTO: 0 K/UL
NRBC BLD-RTO: 0 /100 WBC
PLATELET # BLD AUTO: 229 K/UL (ref 164–446)
PMV BLD AUTO: 9.8 FL (ref 9–12.9)
POTASSIUM SERPL-SCNC: 3.9 MMOL/L (ref 3.6–5.5)
PROT SERPL-MCNC: 5.9 G/DL (ref 6–8.2)
RBC # BLD AUTO: 4.38 M/UL (ref 4.2–5.4)
SODIUM SERPL-SCNC: 138 MMOL/L (ref 135–145)
TROPONIN T SERPL-MCNC: 10 NG/L (ref 6–19)
WBC # BLD AUTO: 7.8 K/UL (ref 4.8–10.8)

## 2022-03-23 PROCEDURE — 85025 COMPLETE CBC W/AUTO DIFF WBC: CPT

## 2022-03-23 PROCEDURE — 84484 ASSAY OF TROPONIN QUANT: CPT

## 2022-03-23 PROCEDURE — 93005 ELECTROCARDIOGRAM TRACING: CPT | Performed by: EMERGENCY MEDICINE

## 2022-03-23 PROCEDURE — 71045 X-RAY EXAM CHEST 1 VIEW: CPT

## 2022-03-23 PROCEDURE — 36415 COLL VENOUS BLD VENIPUNCTURE: CPT

## 2022-03-23 PROCEDURE — 99283 EMERGENCY DEPT VISIT LOW MDM: CPT

## 2022-03-23 PROCEDURE — 80053 COMPREHEN METABOLIC PANEL: CPT

## 2022-03-23 NOTE — ED PROVIDER NOTES
"ED Provider Note    CHIEF COMPLAINT  Chief Complaint   Patient presents with   • Shortness of Breath     Pt states woke this am feeling short of breath, has been taking Prednisone for several weeks r/t rash   • Chest Pain     Pt c/o feeling \"heaviness\" since this am.       HPI  Linda Sinha is a 71 y.o. female who presents to the Emergency Department with a known history of rash, this has been ongoing for several months, she has been seen by dermatology primary care and has an appointment in several weeks to see Brandon.  They are concerned that she may have had a rash secondary to using so many antibiotics after 2 surgeries and multiple sinus infections.  She has been on 50 mg of prednisone and then just started tapering down to 40 mg with her doctor, Dr. Strickland.  She tells me that after titrating down to 40 mg she is feeling like she is fuzzy in her head is having difficulty concentrating last night she could not remember if she took her prednisone or not and today when she woke up she was concerned she may have adrenal crisis she took an extra dose and came in here to be evaluated, she is complaining of shortness of breath and some heaviness in her chest.    REVIEW OF SYSTEMS  As above, all other systems negative.  PAST MEDICAL HISTORY   has a past medical history of Chronic back pain, COVID, and GERD (gastroesophageal reflux disease).    SOCIAL HISTORY  Social History     Tobacco Use   • Smoking status: Never Smoker   • Smokeless tobacco: Never Used   Vaping Use   • Vaping Use: Unknown   Substance and Sexual Activity   • Alcohol use: Not Currently   • Drug use: Not Currently   • Sexual activity: Not on file       SURGICAL HISTORY   has a past surgical history that includes primary c section and breast reconstruction.    CURRENT MEDICATIONS  Reviewed.  See Encounter Summary.  Include   Home Medications    Medication Sig Taking? Last Dose Authorizing Provider   methylPREDNISolone (MEDROL DOSEPAK) 4 MG Tablet " Therapy Pack Use as directed   Keya Raines M.D.   hydrOXYzine pamoate (VISTARIL) 25 MG Cap Take 1 Capsule by mouth 3 times a day as needed for Itching.   Keya Raines M.D.   meloxicam (MOBIC) 15 MG tablet Take one  tablet with breakfast as needed for pain. No advil/aleve/motrin/ibuprofen on same day.   Elise Jenkins M.D.   oxyCODONE (ROXICODONE) 5 MG/5ML solution Take 5 ml  (5 mg) by mouth every 4 hours as needed for pain for 4 days      Progesterone Micronized (PROGESTERONE PO) Take  by mouth.   Physician Outpatient   Nutritional Supplements (DHEA PO) Take  by mouth.   Physician Outpatient   Misc Natural Products (7-KETO LEAN PO) Take  by mouth.   Physician Outpatient   ASPIRIN 81 PO Take  by mouth.   Physician Outpatient   Albuterol Sulfate (PROAIR HFA INH) Inhale  by mouth.   Physician Outpatient   azithromycin (ZITHROMAX) 250 MG Tab Take 2 tabs by mouth once today, then one tab by mouth once daily days 2-5. Complete all medication.  Patient not taking: Reported on 5/1/2019   CASSY PinedaPSarahR.N.   Non Formulary Request Apply 1 Application to affected area(s) every evening. Bio identical Progesterone Cream   Physician Outpatient   albuterol 108 (90 Base) MCG/ACT Aero Soln inhalation aerosol Inhale 2 Puffs by mouth every 6 hours as needed for Shortness of Breath.   Anirudh Pritchett, PSarahA.-C.       ALLERGIES  Allergies   Allergen Reactions   • Botox [Botulinum Toxin Type A, Cosm] Anaphylaxis     Acid reflux       PHYSICAL EXAM  VITAL SIGNS: BP (!) 169/74   Pulse 72   Temp 36.6 °C (97.8 °F) (Temporal)   Resp 14   SpO2 97%   Constitutional:  Alert in no apparent distress.  HENT: Normocephalic, Bilateral external ears normal. Nose normal.   Eyes: Pupils are equal and reactive. Conjunctiva normal, non-icteric.   Thorax & Lungs: Easy unlabored respirations, no wheezes rhonchi or rales  Abdomen:  No gross signs of peritonitis, no pain with movement   Skin: Patient has no diffuse rash there is no  diffuse erythema she does have what she describes as pockmarks on her upper chest which are excoriated only where she can reach up in her lower back,  Extremities:   No edema, No asymmetry  Neurologic: Alert, Grossly non-focal.   Psychiatric: Affect and Mood normal      COURSE & MEDICAL DECISION MAKING  Nursing notes and vital signs were reviewed. (See chart for details)    The patient presents to the Emergency Department with concern for adrenal insufficiency after missing a dose of prednisone, also worsening haziness with decreasing her prednisone.  This may represent atypical coronary syndrome, we will do a chest x-ray to make sure there is no infiltrates, her exam is normal with no wheezing rhonchi or rails, her sats are 97% on room air I do not think this represents an allergic reaction    DX-CHEST-PORTABLE (1 VIEW)   Final Result         1. No acute cardiopulmonary abnormalities are identified.        Results for orders placed or performed during the hospital encounter of 03/23/22   CBC with Differential   Result Value Ref Range    WBC 7.8 4.8 - 10.8 K/uL    RBC 4.38 4.20 - 5.40 M/uL    Hemoglobin 13.4 12.0 - 16.0 g/dL    Hematocrit 41.0 37.0 - 47.0 %    MCV 93.6 81.4 - 97.8 fL    MCH 30.6 27.0 - 33.0 pg    MCHC 32.7 (L) 33.6 - 35.0 g/dL    RDW 46.8 35.9 - 50.0 fL    Platelet Count 229 164 - 446 K/uL    MPV 9.8 9.0 - 12.9 fL    Neutrophils-Polys 82.90 (H) 44.00 - 72.00 %    Lymphocytes 10.00 (L) 22.00 - 41.00 %    Monocytes 5.50 0.00 - 13.40 %    Eosinophils 0.30 0.00 - 6.90 %    Basophils 0.10 0.00 - 1.80 %    Immature Granulocytes 1.20 (H) 0.00 - 0.90 %    Nucleated RBC 0.00 /100 WBC    Neutrophils (Absolute) 6.44 2.00 - 7.15 K/uL    Lymphs (Absolute) 0.78 (L) 1.00 - 4.80 K/uL    Monos (Absolute) 0.43 0.00 - 0.85 K/uL    Eos (Absolute) 0.02 0.00 - 0.51 K/uL    Baso (Absolute) 0.01 0.00 - 0.12 K/uL    Immature Granulocytes (abs) 0.09 0.00 - 0.11 K/uL    NRBC (Absolute) 0.00 K/uL   Complete Metabolic Panel  (CMP)   Result Value Ref Range    Sodium 138 135 - 145 mmol/L    Potassium 3.9 3.6 - 5.5 mmol/L    Chloride 102 96 - 112 mmol/L    Co2 23 20 - 33 mmol/L    Anion Gap 13.0 7.0 - 16.0    Glucose 109 (H) 65 - 99 mg/dL    Bun 14 8 - 22 mg/dL    Creatinine 0.70 0.50 - 1.40 mg/dL    Calcium 8.6 8.4 - 10.2 mg/dL    AST(SGOT) 36 12 - 45 U/L    ALT(SGPT) 41 2 - 50 U/L    Alkaline Phosphatase 41 30 - 99 U/L    Total Bilirubin 0.3 0.1 - 1.5 mg/dL    Albumin 3.9 3.2 - 4.9 g/dL    Total Protein 5.9 (L) 6.0 - 8.2 g/dL    Globulin 2.0 1.9 - 3.5 g/dL    A-G Ratio 2.0 g/dL   Troponin STAT   Result Value Ref Range    Troponin T 10 6 - 19 ng/L   ESTIMATED GFR   Result Value Ref Range    GFR (CKD-EPI) 93 >60 mL/min/1.73 m 2   EKG   Result Value Ref Range    Report       [facility] Emergency Dept.    Test Date:  2022  Pt Name:    FELICE SUE             Department: [facility]  MRN:        [id number]                      Room:       -ROOM 5  Gender:     Female                       Technician: [id number]  :        1950                   Requested By:TRINIDAD EAMNUEL  Order #:    [id number]                    Reading MD:    Measurements  Intervals                                Axis  Rate:       68                           P:          70  DC:         168                          QRS:        33  QRSD:       80                           T:          64  QT:         404  QTc:        430    Interpretive Statements  SINUS RHYTHM  LOW VOLTAGE IN FRONTAL LEADS  BORDERLINE T ABNORMALITIES, ANT-LAT LEADS  Compared to ECG 2020 12:02:10  T-wave abnormality now present       I interpreted this EKG myself.  This is a 12-lead study.  The rhythm is sinus with a rate of 68.  There are no ST segment nor T wave abnormalities.  Interpretation: No ST segment elevation myocardial infarction.    Labs reveal no evidence of adrenal insufficiency, she missed 1 dose which she is already repleted no further treatment is  necessary, I have talked to her about reaching out to her primary care to talk about her tapering or if they want her to stay on 50 mg until she is seen by Kimberly.  This will be discussed between the patient and her primary care doctor who has prescribed the prednisone to begin with.  Her troponin is negative EKG shows no ischemic changes chest x-ray shows no infiltrates.    Patient has had high blood pressure while in the emergency department, felt likely secondary to medical condition. Counseled patient to monitor blood pressure at home and follow up with primary care physician.       The patient was discharged home with an information sheet on rash and told to return immediately for any signs or symptoms listed, or any unexpected symptoms.  The patient verbally agreed to the discharge precautions and follow-up plan which is documented in EPIC.  DISPOSITION:    Patient will be discharged home in stable condition.    FOLLOW UP:  Deon Strickland D.O.  [address] [facility]  [address]  [place] [facility] [address]  [phone]            OUTPATIENT MEDICATIONS:  New Prescriptions    No medications on file         FINAL IMPRESSION   1. Dyspnea, unspecified type    2. Rash

## 2022-03-23 NOTE — ED TRIAGE NOTES
"Chief Complaint   Patient presents with   • Shortness of Breath     Pt states woke this am feeling short of breath, has been taking Prednisone for several weeks r/t rash   • Chest Pain     Pt c/o feeling \"heaviness\" since this am.     Pt states has been taking Prednisone for several weeks r/t full body rash, missed a dose a yesterday and ended up taking two doses yesterday.  Pt states now feels short of breath and chest \"heaviness.\"  Pt states tried Albuterol and Nebulizer without relief.    "

## 2022-08-11 ENCOUNTER — HOSPITAL ENCOUNTER (EMERGENCY)
Facility: MEDICAL CENTER | Age: 72
End: 2022-08-12
Attending: EMERGENCY MEDICINE
Payer: MEDICARE

## 2022-08-11 DIAGNOSIS — J45.41 MODERATE PERSISTENT ASTHMATIC BRONCHITIS WITH ACUTE EXACERBATION: ICD-10-CM

## 2022-08-11 DIAGNOSIS — R06.00 DYSPNEA, UNSPECIFIED TYPE: ICD-10-CM

## 2022-08-11 PROCEDURE — 99283 EMERGENCY DEPT VISIT LOW MDM: CPT

## 2022-08-11 ASSESSMENT — FIBROSIS 4 INDEX: FIB4 SCORE: 1.72

## 2022-08-12 ENCOUNTER — APPOINTMENT (OUTPATIENT)
Dept: RADIOLOGY | Facility: MEDICAL CENTER | Age: 72
End: 2022-08-12
Attending: EMERGENCY MEDICINE
Payer: MEDICARE

## 2022-08-12 VITALS
HEIGHT: 65 IN | DIASTOLIC BLOOD PRESSURE: 73 MMHG | SYSTOLIC BLOOD PRESSURE: 174 MMHG | RESPIRATION RATE: 16 BRPM | HEART RATE: 64 BPM | WEIGHT: 155.65 LBS | BODY MASS INDEX: 25.93 KG/M2 | TEMPERATURE: 97 F | OXYGEN SATURATION: 98 %

## 2022-08-12 LAB — EKG IMPRESSION: NORMAL

## 2022-08-12 PROCEDURE — 71250 CT THORAX DX C-: CPT | Mod: ME

## 2022-08-12 PROCEDURE — 700111 HCHG RX REV CODE 636 W/ 250 OVERRIDE (IP): Performed by: EMERGENCY MEDICINE

## 2022-08-12 PROCEDURE — 93005 ELECTROCARDIOGRAM TRACING: CPT | Performed by: EMERGENCY MEDICINE

## 2022-08-12 RX ORDER — PREDNISONE 10 MG/1
40 TABLET ORAL ONCE
Status: COMPLETED | OUTPATIENT
Start: 2022-08-12 | End: 2022-08-12

## 2022-08-12 RX ORDER — PREDNISONE 20 MG/1
20 TABLET ORAL DAILY
Qty: 4 TABLET | Refills: 0 | Status: SHIPPED | OUTPATIENT
Start: 2022-08-12 | End: 2022-08-16

## 2022-08-12 RX ADMIN — PREDNISONE 40 MG: 10 TABLET ORAL at 02:42

## 2022-08-12 NOTE — ED PROVIDER NOTES
ED Provider Note    ED Provider Note    Scribed for Dameon Hudson MD by Dameon Hudson M.D.. 8/12/2022, 12:07 AM.    Primary care provider: Deon Strickland D.O.  Means of arrival: Private  History obtained from: Patient  History limited by: None    CHIEF COMPLAINT  Chief Complaint   Patient presents with    Shortness of Breath     Pt reports her SOB has been ongoing for 2 weeks. Pt with hx on asthma and on several meds.        HPI  Linda Sinha is a 72 y.o. female who presents to the Emergency Department for evaluation of acute dyspnea.  Patient notes has been diagnosed with asthma, she has been seen by primary care and started on albuterol/ipratropium as well as budesonide.  Symptoms apparent for the last 2 weeks, more so for the last 4 days.  She notes initially symptoms seem to be associated with smoke from wildfires, no recent wildfires and given she has been compliant with her medications with worsening sensation of dyspnea she came to be assessed.  She describes sensation of tightness to the center of the chest, no productive cough, no fever.  No obvious exertional component.  She relates she spoke with her primary care and was told to come to the ED for imaging of the chest.  She has no established history of coronary artery disease.  She does endorse transient peripheral edema that is currently resolved.  No active vomiting or febrile illness.    REVIEW OF SYSTEMS  Pertinent positives include dyspnea with sensation of tightness to the chest. Pertinent negatives include no fever, no vomiting.  All other systems reviewed and negative.    PAST MEDICAL HISTORY   has a past medical history of Chronic back pain, COVID, and GERD (gastroesophageal reflux disease).    SURGICAL HISTORY   has a past surgical history that includes primary c section and breast reconstruction.    SOCIAL HISTORY  Social History     Tobacco Use    Smoking status: Never    Smokeless tobacco: Never   Vaping Use    Vaping  "Use: Never used   Substance Use Topics    Alcohol use: Not Currently    Drug use: Never      Social History     Substance and Sexual Activity   Drug Use Never       FAMILY HISTORY  History reviewed. No pertinent family history.    CURRENT MEDICATIONS  Home Medications       Reviewed by Magdalena Matute R.N. (Registered Nurse) on 08/11/22 at 2350  Med List Status: Not Addressed     Medication Last Dose Status   albuterol 108 (90 Base) MCG/ACT Aero Soln inhalation aerosol  Active   Albuterol Sulfate (PROAIR HFA INH)  Active   ASPIRIN 81 PO  Active   azithromycin (ZITHROMAX) 250 MG Tab  Active   hydrOXYzine pamoate (VISTARIL) 25 MG Cap  Active   meloxicam (MOBIC) 15 MG tablet  Active   methylPREDNISolone (MEDROL DOSEPAK) 4 MG Tablet Therapy Pack  Active   Misc Natural Products (7-KETO LEAN PO)  Active   Non Formulary Request  Active   Nutritional Supplements (DHEA PO)  Active   oxyCODONE (ROXICODONE) 5 MG/5ML solution  Active   Progesterone Micronized (PROGESTERONE PO)  Active                    ALLERGIES  Allergies   Allergen Reactions    Botox [Botulinum Toxin Type A, Cosm] Anaphylaxis     Acid reflux       PHYSICAL EXAM  VITAL SIGNS: BP (!) 174/73   Pulse 64   Temp 36.1 °C (97 °F) (Temporal)   Resp 16   Ht 1.651 m (5' 5\")   Wt 70.6 kg (155 lb 10.3 oz)   SpO2 98%   BMI 25.90 kg/m²     General: Alert, no acute distress  Skin: Warm, dry, normal for ethnicity  Head: Normocephalic, atraumatic  Neck: Trachea midline, no tenderness  Eye: PERRL, normal conjunctiva  ENMT: Oral mucosa moist, no pharyngeal erythema or exudate  Cardiovascular: Regular rate and rhythm, No murmur, Normal peripheral perfusion  Respiratory: Lungs CTA, respirations are non-labored, breath sounds are equal; no rales, no rhonchi.  Musculoskeletal: No swelling, no deformity  Neurological: Alert and oriented to person, place, time, and situation  Lymphatics: No lymphadenopathy  Psychiatric: Cooperative, mildly anxious, appropriate mood & " affect      EKG  12 Lead EKG obtained at 0123 and interpreted by me to show:  Rhythm: Normal sinus rhythm   Rate: 63  Axis: Normal  Intervals: Normal  Q Waves: Normal  No diagnostic ST segment elevation    Clinical Impression: Normal EKG  Compared to none available    RADIOLOGY  CT-CHEST (THORAX) W/O   Final Result         1.  No acute intrathoracic abnormality identified   2.  Atherosclerosis and atherosclerotic coronary artery disease   3.  Pulmonary nodules measuring up to 8.1 mm, see nodule follow-up recommendations below.      Fleischner Society pulmonary nodule recommendations:      Low Risk: CT at 3-6 months, then consider CT at 18-24 months      High Risk: CT at 3-6 months, then at 18-24 months      Comments: Use most suspicious nodule as guide to management. Follow-up intervals may vary according to size and risk.      Low Risk - Minimal or absent history of smoking and of other known risk factors.      High Risk - History of smoking or of other known risk factors.      Note: These recommendations do not apply to lung cancer screening, patients with immunosuppression, or patients with known primary cancer.      Fleischner Society 2017 Guidelines for Management of Incidentally Detected Pulmonary Nodules in Adults      CT-CHEST (THORAX) W/O    (Results Pending)     The radiologist's interpretation of all radiological studies have been reviewed by me.    COURSE & MEDICAL DECISION MAKING  Pertinent Labs & Imaging studies reviewed. (See chart for details)    12:07 AM - Patient seen and examined at bedside. Ordered EKG and CT of chest to evaluate her symptoms. The differential diagnoses include but are not limited to: Bronchitis, pneumonia, pulmonary edema, pleural effusion, metastatic process    0136: Patient reassessed, she is in no ramu distress.  I apologize for the wait.    0210: I have had last been able to discuss the results with the patient.  I have heard back from radiologist as well and concurs with  "plan for repeat CT in 3 months, I have ordered this.  She is in no ramu distress, I suspect acutely asthmatic bronchitis, she will continue her albuterol as needed, comfortable with starting brief course of prednisone.  I have ordered prednisone 40 mg here in the ED.    Patient Vitals for the past 24 hrs:   BP Temp Temp src Pulse Resp SpO2 Height Weight   08/12/22 0228 (!) 174/73 -- -- 64 -- 98 % -- --   08/11/22 2333 (!) 177/98 36.1 °C (97 °F) Temporal 85 16 96 % 1.651 m (5' 5\") 70.6 kg (155 lb 10.3 oz)        Decision Making:  This is a 72 y.o. year old female who presents with sensation of acute dyspnea worse last 4 days.  She has had symptoms chronically for quite some time, noting acutely worse usually when exposed to smoke.  I suspect likely asthmatic bronchitis, I feel she has no significant wheezing and is not hypoxic.  Speaking full sentences and not demonstrating any evidence of respiratory distress.  No indication for inpatient management as such.  EKG is nonischemic, CT of the chest is obtained demonstrating no emphysematous changes and more acutely no evidence of infiltrate nor pulmonary edema/effusion.  Admit course of steroids is reasonable given suspicion of asthma and likely acute asthma exacerbation.  She is comfortable continuing her albuterol at home as needed.       The patient will return for new or worsening symptoms and is stable at the time of discharge.    Patient has had high blood pressure while in the emergency department, felt likely secondary to medical condition. Counseled patient to monitor blood pressure at home and follow up with primary care physician.      DISPOSITION:  Patient will be discharged home in stable condition.    FOLLOW UP:  Deon Strickland D.O.  [address] [facility]  [address]  [facility] [address]  [phone]    Schedule an appointment as soon as possible for a visit       OUTPATIENT MEDICATIONS:  Discharge Medication List as of 8/12/2022  2:27 AM        START taking these " medications    Details   predniSONE (DELTASONE) 20 MG Tab Take 1 Tablet by mouth every day for 4 days., Disp-4 Tablet, R-0, Normal                FINAL IMPRESSION  1. Dyspnea, unspecified type    2. Moderate persistent asthmatic bronchitis with acute exacerbation          Dameon LEE M.D. (Scribe), am scribing for, and in the presence of, Dameon Hudson MD.    Electronically signed by: Dameon Hudson M.D. (Scribe), 8/12/2022    IDameon MD personally performed the services described in this documentation, as scribed by Dameon Hudson M.D. in my presence, and it is both accurate and complete    The note accurately reflects work and decisions made by me.  Dameon Hudson M.D.  8/12/2022  4:10 AM

## 2022-11-11 ENCOUNTER — PATIENT MESSAGE (OUTPATIENT)
Dept: HEALTH INFORMATION MANAGEMENT | Facility: OTHER | Age: 72
End: 2022-11-11

## 2022-11-12 ENCOUNTER — HOSPITAL ENCOUNTER (OUTPATIENT)
Dept: RADIOLOGY | Facility: MEDICAL CENTER | Age: 72
End: 2022-11-12
Attending: EMERGENCY MEDICINE
Payer: MEDICARE

## 2022-11-12 DIAGNOSIS — R06.00 DYSPNEA, UNSPECIFIED TYPE: ICD-10-CM

## 2022-11-12 PROCEDURE — 71250 CT THORAX DX C-: CPT

## 2022-11-30 DIAGNOSIS — Z01.812 PRE-PROCEDURE LAB EXAM: ICD-10-CM

## 2022-11-30 NOTE — PROGRESS NOTES
Request received to review order/ protocol for coronary CTA. Scan approved. Upon review of available medical records, the following orders have been placed:    Order placed for outpatient, non fasting creatinine blood draw to check kidney function within 30 days prior to contrast administration for coronary CTA if determined medically necessary by CT staff. Instructions for timing of blood test to be given by scheduling/ CT facility team.    This CT study may be scheduled through [facility] Scheduling at , option 2.

## 2023-06-09 ENCOUNTER — OFFICE VISIT (OUTPATIENT)
Dept: URGENT CARE | Facility: CLINIC | Age: 73
End: 2023-06-09
Payer: MEDICARE

## 2023-06-09 VITALS
RESPIRATION RATE: 18 BRPM | HEIGHT: 65 IN | WEIGHT: 150 LBS | SYSTOLIC BLOOD PRESSURE: 142 MMHG | OXYGEN SATURATION: 96 % | TEMPERATURE: 97.8 F | HEART RATE: 96 BPM | BODY MASS INDEX: 24.99 KG/M2 | DIASTOLIC BLOOD PRESSURE: 86 MMHG

## 2023-06-09 DIAGNOSIS — M54.50 CHRONIC LEFT-SIDED LOW BACK PAIN WITHOUT SCIATICA: ICD-10-CM

## 2023-06-09 DIAGNOSIS — G89.29 CHRONIC LEFT-SIDED LOW BACK PAIN WITHOUT SCIATICA: ICD-10-CM

## 2023-06-09 DIAGNOSIS — M54.17 LUMBOSACRAL RADICULOPATHY AT L1: ICD-10-CM

## 2023-06-09 DIAGNOSIS — S39.012A STRAIN OF LUMBAR REGION, INITIAL ENCOUNTER: ICD-10-CM

## 2023-06-09 PROCEDURE — 99203 OFFICE O/P NEW LOW 30 MIN: CPT | Performed by: PHYSICIAN ASSISTANT

## 2023-06-09 PROCEDURE — 3079F DIAST BP 80-89 MM HG: CPT | Performed by: PHYSICIAN ASSISTANT

## 2023-06-09 PROCEDURE — 3077F SYST BP >= 140 MM HG: CPT | Performed by: PHYSICIAN ASSISTANT

## 2023-06-09 RX ORDER — CYCLOBENZAPRINE HCL 10 MG
10 TABLET ORAL 3 TIMES DAILY PRN
Qty: 30 TABLET | Refills: 0 | Status: SHIPPED | OUTPATIENT
Start: 2023-06-09

## 2023-06-09 RX ORDER — LIDOCAINE 50 MG/G
1 PATCH TOPICAL EVERY 24 HOURS
Qty: 30 PATCH | Refills: 0 | Status: SHIPPED | OUTPATIENT
Start: 2023-06-09

## 2023-06-09 ASSESSMENT — FIBROSIS 4 INDEX: FIB4 SCORE: 1.72

## 2023-06-09 NOTE — PROGRESS NOTES
"Subjective:   Linda Sinha is a 72 y.o. female who presents for Back Pain (Left back pain)    This a very pleasant 72-year-old female who presents today with chief complaint of left-sided low back pain with radiation into the left lower abdomen and groin.  She was seen and evaluated at [facility] yesterday.  Records not available.  It appears she underwent a CT of the abdomen and pelvis which demonstrated no acute changes.  She does have a report here for review.  They did rule out nephrolithiasis.  She was prescribed some lidocaine patches which she was unable to  due to ICD 10 issues.  She reports today history of lumbar issues.  She has chronic numbness and tingling to the feet.  She reports pain radiating from the back towards the front.  Is aggravated with movement.  She denies lower extremity weakness or gait disturbance.  No bowel or bladder changes.    Medications:  7-KETO LEAN PO  albuterol Aers  ASPIRIN 81 PO  azithromycin Tabs  DHEA PO  hydrOXYzine pamoate Caps  meloxicam  methylPREDNISolone Tbpk  Non Formulary Request  oxyCODONE  PROAIR HFA INH  PROGESTERONE PO    Allergies:             Botox [botulinum toxin type a, cosm]    Surgical History:         Past Surgical History:   Procedure Laterality Date    BREAST RECONSTRUCTION      PRIMARY C SECTION         Past Social Hx:  Linda Sinha  reports that she has never smoked. She has never used smokeless tobacco. She reports that she does not currently use alcohol. She reports that she does not use drugs.     Past Family Hx:   Linda Sinha family history is not on file.       Problem list, medications, and allergies reviewed by myself today in Epic.     Objective:     BP (!) 142/86 (BP Location: Left arm, Patient Position: Sitting, BP Cuff Size: Adult)   Pulse 96   Temp 36.6 °C (97.8 °F) (Temporal)   Resp 18   Ht 1.651 m (5' 5\")   Wt 68 kg (150 lb)   SpO2 96%   BMI 24.96 kg/m²     Physical Exam  Vitals and " nursing note reviewed.   Constitutional:       General: She is not in acute distress.     Appearance: Normal appearance.   Eyes:      Extraocular Movements: Extraocular movements intact.      Pupils: Pupils are equal, round, and reactive to light.   Cardiovascular:      Rate and Rhythm: Normal rate and regular rhythm.      Pulses: Normal pulses.      Heart sounds: Normal heart sounds.   Pulmonary:      Effort: Pulmonary effort is normal. No respiratory distress.      Breath sounds: Normal breath sounds. No stridor. No wheezing or rhonchi.   Abdominal:      Palpations: Abdomen is soft.      Tenderness: There is no right CVA tenderness or left CVA tenderness.   Musculoskeletal:         General: Tenderness and signs of injury present.      Cervical back: Normal and normal range of motion.      Thoracic back: Normal.      Lumbar back: Spasms and tenderness present. No deformity or bony tenderness. Decreased range of motion. Negative right straight leg raise test and negative left straight leg raise test.      Right lower leg: No edema.      Left lower leg: No edema.      Comments: Diffuse tenderness to mid to upper left upper lumbar paraspinous musculature,Palpable spasm noted with taut bands  No midline spinal tenderness  Motor 5/5 b/l LE's  Sensation intact to LT/PP  DTRs 1+/symmetrical  Range of motion diminished by 50% in a multiplanar fashion  Gait non-antalgic  SLR negative b/l   Skin:     General: Skin is warm.   Neurological:      General: No focal deficit present.      Mental Status: She is alert and oriented to person, place, and time.      Sensory: No sensory deficit.      Motor: Motor function is intact. No weakness.      Gait: Gait normal.      Deep Tendon Reflexes: Reflexes are normal and symmetric. Reflexes normal.         Assessment/Plan:     Diagnosis and Associated Orders:     1. Lumbosacral radiculopathy at L1  - lidocaine (LIDODERM) 5 % Patch; Place 1 Patch on the skin every 24 hours.  Dispense: 30  Patch; Refill: 0  - Referral to Spine Surgery    2. Chronic left-sided low back pain without sciatica  - cyclobenzaprine (FLEXERIL) 10 mg Tab; Take 1 Tablet by mouth 3 times a day as needed for Muscle Spasms.  Dispense: 30 Tablet; Refill: 0  - lidocaine (LIDODERM) 5 % Patch; Place 1 Patch on the skin every 24 hours.  Dispense: 30 Patch; Refill: 0  - Referral to Spine Surgery    3. Strain of lumbar region, initial encounter  - lidocaine (LIDODERM) 5 % Patch; Place 1 Patch on the skin every 24 hours.  Dispense: 30 Patch; Refill: 0  - Referral to Spine Surgery        Comments/MDM:  Patient presents with left-sided back pain with radiation to the abdomen.  CT abdomen and pelvis done at [facility] was benign yesterday without acute findings.  I believe this represents a lumbar radiculopathy.  We did reissue her prescription for Lidoderm patches.  Referrals placed to neurosurgery.  Cyclobenzaprine, 5 mg 1 p.o. 3 times daily as needed spasm, caution sedation, do not drive.  May alternate ice with heat.  Follow-up in ER with weakness bowel or bladder dysfunction sooner.  I personally reviewed prior external notes and test results pertinent to today's visit. Supportive care, natural history, differential diagnoses, and indications for immediate follow-up discussed. Return to clinic or go to ED if symptoms worsen or persist.  Red flag symptoms discussed.  Patient/Parent/Guardian voices understanding. Follow-up with your primary care provider in 3-5 days.  All side effects of medication discussed including allergic response, GI upset, tendon injury, rash, sedation etc    Please note that this dictation was created using voice recognition software. I have made a reasonable attempt to correct obvious errors, but I expect that there are errors of grammar and possibly content that I did not discover before finalizing the note.    This note was electronically signed by Katie Moser PA-C

## 2023-12-04 ENCOUNTER — OFFICE VISIT (OUTPATIENT)
Dept: URGENT CARE | Facility: CLINIC | Age: 73
End: 2023-12-04
Payer: MEDICARE

## 2023-12-04 ENCOUNTER — APPOINTMENT (OUTPATIENT)
Dept: RADIOLOGY | Facility: IMAGING CENTER | Age: 73
End: 2023-12-04
Attending: PHYSICIAN ASSISTANT
Payer: MEDICARE

## 2023-12-04 VITALS
DIASTOLIC BLOOD PRESSURE: 60 MMHG | BODY MASS INDEX: 22.49 KG/M2 | SYSTOLIC BLOOD PRESSURE: 132 MMHG | OXYGEN SATURATION: 98 % | WEIGHT: 135 LBS | HEIGHT: 65 IN | TEMPERATURE: 97.3 F | RESPIRATION RATE: 18 BRPM | HEART RATE: 86 BPM

## 2023-12-04 DIAGNOSIS — S90.851A FOREIGN BODY IN RIGHT FOOT, INITIAL ENCOUNTER: ICD-10-CM

## 2023-12-04 PROCEDURE — 3075F SYST BP GE 130 - 139MM HG: CPT | Performed by: PHYSICIAN ASSISTANT

## 2023-12-04 PROCEDURE — 99213 OFFICE O/P EST LOW 20 MIN: CPT | Mod: 25 | Performed by: PHYSICIAN ASSISTANT

## 2023-12-04 PROCEDURE — 3078F DIAST BP <80 MM HG: CPT | Performed by: PHYSICIAN ASSISTANT

## 2023-12-04 PROCEDURE — 10120 INC&RMVL FB SUBQ TISS SMPL: CPT | Performed by: PHYSICIAN ASSISTANT

## 2023-12-04 PROCEDURE — 73630 X-RAY EXAM OF FOOT: CPT | Mod: TC,RT | Performed by: PHYSICIAN ASSISTANT

## 2023-12-04 ASSESSMENT — FIBROSIS 4 INDEX: FIB4 SCORE: 2.03

## 2023-12-05 NOTE — PROGRESS NOTES
Subjective:   Linda Sinha is a 73 y.o. female who presents today with   Chief Complaint   Patient presents with    Foot Problem     Glass in foot X2 month     Foot Problem  This is a new problem. The current episode started more than 1 month ago. The problem occurs constantly. The problem has been unchanged. She has tried nothing for the symptoms. The treatment provided no relief.     Patient states that a piece of glass broke at home and she thought she got all the pieces picked up but then felt that she stepped on a piece a few weeks later.  Has been trying to get foreign body out of the foot and got a small piece of glass but still feels discomfort in the area.    PMH:  has a past medical history of Chronic back pain, COVID, and GERD (gastroesophageal reflux disease).  MEDS:   Current Outpatient Medications:     Nutritional Supplements (DHEA PO), Take  by mouth., Disp: , Rfl:     Albuterol Sulfate (PROAIR HFA INH), Inhale  by mouth., Disp: , Rfl:     Non Formulary Request, Apply 1 Application to affected area(s) every evening. Bio identical Progesterone Cream, Disp: , Rfl:     albuterol 108 (90 Base) MCG/ACT Aero Soln inhalation aerosol, Inhale 2 Puffs by mouth every 6 hours as needed for Shortness of Breath., Disp: 8.5 g, Rfl: 0    cyclobenzaprine (FLEXERIL) 10 mg Tab, Take 1 Tablet by mouth 3 times a day as needed for Muscle Spasms., Disp: 30 Tablet, Rfl: 0    lidocaine (LIDODERM) 5 % Patch, Place 1 Patch on the skin every 24 hours., Disp: 30 Patch, Rfl: 0    methylPREDNISolone (MEDROL DOSEPAK) 4 MG Tablet Therapy Pack, Use as directed, Disp: 1 Each, Rfl: 0    hydrOXYzine pamoate (VISTARIL) 25 MG Cap, Take 1 Capsule by mouth 3 times a day as needed for Itching., Disp: 60 Capsule, Rfl: 0    meloxicam (MOBIC) 15 MG tablet, Take one  tablet with breakfast as needed for pain. No advil/aleve/motrin/ibuprofen on same day., Disp: 90 tablet, Rfl: 2    oxyCODONE (ROXICODONE) 5 MG/5ML solution, Take 5 ml  (5 mg)  "by mouth every 4 hours as needed for pain for 4 days, Disp: 120 mL, Rfl: 0    Progesterone Micronized (PROGESTERONE PO), Take  by mouth., Disp: , Rfl:     Misc Natural Products (7-KETO LEAN PO), Take  by mouth., Disp: , Rfl:     ASPIRIN 81 PO, Take  by mouth., Disp: , Rfl:     azithromycin (ZITHROMAX) 250 MG Tab, Take 2 tabs by mouth once today, then one tab by mouth once daily days 2-5. Complete all medication. (Patient not taking: Reported on 5/1/2019), Disp: 6 Tab, Rfl: 0  ALLERGIES:   Allergies   Allergen Reactions    Botox [Botulinum Toxin Type A, Cosm] Anaphylaxis     Acid reflux     SURGHX:   Past Surgical History:   Procedure Laterality Date    BREAST RECONSTRUCTION      PRIMARY C SECTION       SOCHX:  reports that she has never smoked. She has never used smokeless tobacco. She reports that she does not currently use alcohol. She reports that she does not use drugs.  FH: Reviewed with patient, not pertinent to this visit.     Review of Systems   Skin:         Foreign body in right foot      Objective:   /60 (BP Location: Left arm, Patient Position: Sitting, BP Cuff Size: Adult)   Pulse 86   Temp 36.3 °C (97.3 °F) (Temporal)   Resp 18   Ht 1.651 m (5' 5\")   Wt 61.2 kg (135 lb)   SpO2 98%   BMI 22.47 kg/m²   Physical Exam  Vitals and nursing note reviewed.   Constitutional:       General: She is not in acute distress.     Appearance: Normal appearance. She is well-developed. She is not ill-appearing or toxic-appearing.   HENT:      Head: Normocephalic and atraumatic.      Right Ear: Hearing normal.      Left Ear: Hearing normal.   Cardiovascular:      Rate and Rhythm: Normal rate.   Pulmonary:      Effort: Pulmonary effort is normal.   Musculoskeletal:        Feet:       Comments: Pinpoint area with surrounding thick skin/scar tissue central area of heart or tissue/foreign body.  No surrounding erythema or drainage or discharge and no streaking.   Skin:     General: Skin is warm and dry. "   Neurological:      Mental Status: She is alert.      Coordination: Coordination normal.   Psychiatric:         Mood and Affect: Mood normal.     DX FOOT     FINDINGS:     There is normal bony mineralization.  There is no evidence of fracture, dislocation, or osseous lesion.  There is no evidence of soft tissue injury.     IMPRESSION:        1.  No radiographic evidence of acute injury.        2. No evidence of radiopaque foreign body in the soft tissues of the right foot.    Assessment/Plan:   Assessment    1. Foreign body in right foot, initial encounter  - DX-FOOT-COMPLETE 3+ RIGHT; Future    Patient believes there is likely foreign body in her foot and would like me to try to remove it.  Verbal consent obtained from patient prior to procedure.  Area was initially cleaned with Betadine.  Initially tried to inject 1% lidocaine without epinephrine to the area as patient requested although I recommended just trying with the scalpel but she would like me to try to inject the area with lidocaine but she could not tolerate this.  I used a 10 blade scalpel to superficially remove the top layer of skin and no bleeding occurred.  At 1 point did see a small sliver of shiny material that would be most consistent with likely glass foreign body.  Removed what appeared consistent with scar tissue but could also be consistent with plantar wart without the history of foreign body.  Antibiotic ointment and bandage placed to the area.  Recommend regular bandage changes and warm soaks to the area for the next few days.  Area is not an open wound and is only superficial skin layer that was removed.  Got down to the base and no bleeding occurred and did not see any further foreign body.  No signs of infection today.  Return with any new or worsening symptoms.  Patient can also follow-up with podiatry if no improvement in symptoms noted but she states she feels like the area is not as tender after procedure today.    Differential  diagnosis, natural history, supportive care, and indications for immediate follow-up discussed.   Patient given instructions and understanding of medications and treatment.    If not improving in 3-5 days, F/U with PCP or return to UC if symptoms worsen.    Patient agreeable to plan.      Please note that this dictation was created using voice recognition software. I have made every reasonable attempt to correct obvious errors, but I expect that there are errors of grammar and possibly content that I did not discover before finalizing the note.    Dakota Weinstein PA-C

## 2024-04-16 ENCOUNTER — OFFICE VISIT (OUTPATIENT)
Dept: URGENT CARE | Facility: CLINIC | Age: 74
End: 2024-04-16
Payer: MEDICARE

## 2024-04-16 VITALS
WEIGHT: 135 LBS | DIASTOLIC BLOOD PRESSURE: 60 MMHG | HEIGHT: 65 IN | TEMPERATURE: 97.4 F | SYSTOLIC BLOOD PRESSURE: 118 MMHG | BODY MASS INDEX: 22.49 KG/M2 | HEART RATE: 81 BPM | OXYGEN SATURATION: 96 %

## 2024-04-16 DIAGNOSIS — J06.9 VIRAL URI WITH COUGH: ICD-10-CM

## 2024-04-16 DIAGNOSIS — R06.2 WHEEZING: ICD-10-CM

## 2024-04-16 PROCEDURE — 3074F SYST BP LT 130 MM HG: CPT | Performed by: NURSE PRACTITIONER

## 2024-04-16 PROCEDURE — 3078F DIAST BP <80 MM HG: CPT | Performed by: NURSE PRACTITIONER

## 2024-04-16 PROCEDURE — 99213 OFFICE O/P EST LOW 20 MIN: CPT | Performed by: NURSE PRACTITIONER

## 2024-04-16 RX ORDER — PREDNISONE 20 MG/1
TABLET ORAL
Qty: 10 TABLET | Refills: 0 | Status: SHIPPED | OUTPATIENT
Start: 2024-04-16

## 2024-04-16 RX ORDER — ALBUTEROL SULFATE 90 UG/1
2 AEROSOL, METERED RESPIRATORY (INHALATION) EVERY 6 HOURS PRN
Qty: 8.5 G | Refills: 0 | Status: SHIPPED | OUTPATIENT
Start: 2024-04-16

## 2024-04-16 ASSESSMENT — FIBROSIS 4 INDEX: FIB4 SCORE: 2.03

## 2024-04-16 ASSESSMENT — ENCOUNTER SYMPTOMS: COUGH: 1

## 2024-04-16 NOTE — PROGRESS NOTES
Subjective     Linda Sinha is a 73 y.o. female who presents with Follow-Up (Couldn't breath last night /Did breathing treatment this morning /)            Sinus Problem  This is a new problem. Episode onset: pt reports new onset of runny nose, congestion and cough that started 2 weeks ago. cough is productive. has been treated recently by her PCP with 2 antibiotics. symptoms improved for the last 5 days. feels like cold symptoms returned last night. Associated symptoms include congestion and coughing. (Felt SOB while sleeping last night) Treatments tried: nebulizer and albuterol inhaler. neti pot. The treatment provided no relief.       Review of Systems   HENT:  Positive for congestion.    Respiratory:  Positive for cough.    All other systems reviewed and are negative.         Past Medical History:   Diagnosis Date    Chronic back pain     COVID     2021    GERD (gastroesophageal reflux disease)       Past Surgical History:   Procedure Laterality Date    BREAST RECONSTRUCTION      PRIMARY C SECTION        Social History     Socioeconomic History    Marital status:      Spouse name: Not on file    Number of children: Not on file    Years of education: Not on file    Highest education level: Not on file   Occupational History    Not on file   Tobacco Use    Smoking status: Never    Smokeless tobacco: Never   Vaping Use    Vaping Use: Never used   Substance and Sexual Activity    Alcohol use: Not Currently    Drug use: Never    Sexual activity: Not on file   Other Topics Concern    Not on file   Social History Narrative    Not on file     Social Determinants of Health     Financial Resource Strain: Not on file   Food Insecurity: Not on file   Transportation Needs: Not on file   Physical Activity: Not on file   Stress: Not on file   Social Connections: Not on file   Intimate Partner Violence: Not on file   Housing Stability: Not on file         Objective     /60 (BP Location: Left arm, Patient  "Position: Sitting, BP Cuff Size: Adult)   Pulse 81   Temp 36.3 °C (97.4 °F) (Temporal)   Ht 1.651 m (5' 5\")   Wt 61.2 kg (135 lb)   SpO2 96%   BMI 22.47 kg/m²      Physical Exam  Vitals and nursing note reviewed.   Constitutional:       Appearance: Normal appearance. She is normal weight.   HENT:      Head: Normocephalic and atraumatic.      Right Ear: Tympanic membrane and external ear normal.      Left Ear: Tympanic membrane and external ear normal.      Nose: Nose normal. No congestion.      Mouth/Throat:      Mouth: Mucous membranes are moist.      Pharynx: Oropharynx is clear.   Eyes:      Extraocular Movements: Extraocular movements intact.      Pupils: Pupils are equal, round, and reactive to light.   Cardiovascular:      Rate and Rhythm: Normal rate and regular rhythm.   Pulmonary:      Effort: Pulmonary effort is normal.      Breath sounds: Examination of the right-upper field reveals wheezing. Examination of the left-upper field reveals wheezing. Wheezing (mild) present.   Musculoskeletal:         General: Normal range of motion.      Cervical back: Normal range of motion.   Skin:     General: Skin is warm and dry.      Capillary Refill: Capillary refill takes less than 2 seconds.   Neurological:      General: No focal deficit present.      Mental Status: She is alert and oriented to person, place, and time. Mental status is at baseline.   Psychiatric:         Mood and Affect: Mood normal.         Speech: Speech normal.         Thought Content: Thought content normal.         Judgment: Judgment normal.                             Assessment & Plan        1. Viral URI with cough    2. Wheezing  - predniSONE (DELTASONE) 20 MG Tab; Take 2 tabs PO daily for 5 days  Dispense: 10 Tablet; Refill: 0  - albuterol 108 (90 Base) MCG/ACT Aero Soln inhalation aerosol; Inhale 2 Puffs every 6 hours as needed for Shortness of Breath.  Dispense: 8.5 g; Refill: 0       Contingent burst of pred provided  Continue " inhaler and nebulizer as directed  Encouraged rest and fluids  Please follow up with PCP if not improving  Supportive care, differential diagnoses, and indications for immediate follow-up discussed with patient.    Pathogenesis of diagnosis discussed including typical length and natural progression.    Instructed to return to  or nearest emergency department if symptoms fail to improve, for any change in condition, further concerns, or new concerning symptoms.  Patient states understanding of the plan of care and discharge instructions.

## 2024-04-29 ENCOUNTER — HOSPITAL ENCOUNTER (EMERGENCY)
Facility: MEDICAL CENTER | Age: 74
End: 2024-04-29
Attending: STUDENT IN AN ORGANIZED HEALTH CARE EDUCATION/TRAINING PROGRAM
Payer: MEDICARE

## 2024-04-29 ENCOUNTER — APPOINTMENT (OUTPATIENT)
Dept: RADIOLOGY | Facility: MEDICAL CENTER | Age: 74
End: 2024-04-29
Attending: STUDENT IN AN ORGANIZED HEALTH CARE EDUCATION/TRAINING PROGRAM
Payer: MEDICARE

## 2024-04-29 ENCOUNTER — APPOINTMENT (OUTPATIENT)
Dept: RADIOLOGY | Facility: MEDICAL CENTER | Age: 74
End: 2024-04-29
Payer: MEDICARE

## 2024-04-29 VITALS
RESPIRATION RATE: 19 BRPM | WEIGHT: 141.76 LBS | DIASTOLIC BLOOD PRESSURE: 81 MMHG | HEIGHT: 65 IN | BODY MASS INDEX: 23.62 KG/M2 | HEART RATE: 84 BPM | OXYGEN SATURATION: 94 % | SYSTOLIC BLOOD PRESSURE: 173 MMHG | TEMPERATURE: 98 F

## 2024-04-29 DIAGNOSIS — J32.9 SINUSITIS, UNSPECIFIED CHRONICITY, UNSPECIFIED LOCATION: ICD-10-CM

## 2024-04-29 LAB
ALBUMIN SERPL BCP-MCNC: 3.9 G/DL (ref 3.2–4.9)
ALBUMIN/GLOB SERPL: 1.4 G/DL
ALP SERPL-CCNC: 69 U/L (ref 30–99)
ALT SERPL-CCNC: 28 U/L (ref 2–50)
ANION GAP SERPL CALC-SCNC: 16 MMOL/L (ref 7–16)
AST SERPL-CCNC: 42 U/L (ref 12–45)
BASOPHILS # BLD AUTO: 0.4 % (ref 0–1.8)
BASOPHILS # BLD: 0.03 K/UL (ref 0–0.12)
BILIRUB SERPL-MCNC: 0.2 MG/DL (ref 0.1–1.5)
BUN SERPL-MCNC: 23 MG/DL (ref 8–22)
CALCIUM ALBUM COR SERPL-MCNC: 8.8 MG/DL (ref 8.5–10.5)
CALCIUM SERPL-MCNC: 8.7 MG/DL (ref 8.5–10.5)
CHLORIDE SERPL-SCNC: 101 MMOL/L (ref 96–112)
CO2 SERPL-SCNC: 22 MMOL/L (ref 20–33)
CREAT SERPL-MCNC: 0.83 MG/DL (ref 0.5–1.4)
EOSINOPHIL # BLD AUTO: 0.09 K/UL (ref 0–0.51)
EOSINOPHIL NFR BLD: 1.1 % (ref 0–6.9)
ERYTHROCYTE [DISTWIDTH] IN BLOOD BY AUTOMATED COUNT: 44.8 FL (ref 35.9–50)
GFR SERPLBLD CREATININE-BSD FMLA CKD-EPI: 75 ML/MIN/1.73 M 2
GLOBULIN SER CALC-MCNC: 2.7 G/DL (ref 1.9–3.5)
GLUCOSE SERPL-MCNC: 89 MG/DL (ref 65–99)
HCT VFR BLD AUTO: 41 % (ref 37–47)
HGB BLD-MCNC: 13.6 G/DL (ref 12–16)
IMM GRANULOCYTES # BLD AUTO: 0.02 K/UL (ref 0–0.11)
IMM GRANULOCYTES NFR BLD AUTO: 0.3 % (ref 0–0.9)
LYMPHOCYTES # BLD AUTO: 2.05 K/UL (ref 1–4.8)
LYMPHOCYTES NFR BLD: 25.7 % (ref 22–41)
MCH RBC QN AUTO: 30.4 PG (ref 27–33)
MCHC RBC AUTO-ENTMCNC: 33.2 G/DL (ref 32.2–35.5)
MCV RBC AUTO: 91.7 FL (ref 81.4–97.8)
MONOCYTES # BLD AUTO: 0.81 K/UL (ref 0–0.85)
MONOCYTES NFR BLD AUTO: 10.2 % (ref 0–13.4)
NEUTROPHILS # BLD AUTO: 4.97 K/UL (ref 1.82–7.42)
NEUTROPHILS NFR BLD: 62.3 % (ref 44–72)
NRBC # BLD AUTO: 0 K/UL
NRBC BLD-RTO: 0 /100 WBC (ref 0–0.2)
PLATELET # BLD AUTO: 209 K/UL (ref 164–446)
PMV BLD AUTO: 11.3 FL (ref 9–12.9)
POTASSIUM SERPL-SCNC: 4.5 MMOL/L (ref 3.6–5.5)
PROT SERPL-MCNC: 6.6 G/DL (ref 6–8.2)
RBC # BLD AUTO: 4.47 M/UL (ref 4.2–5.4)
S PYO DNA SPEC NAA+PROBE: NOT DETECTED
SODIUM SERPL-SCNC: 139 MMOL/L (ref 135–145)
WBC # BLD AUTO: 8 K/UL (ref 4.8–10.8)

## 2024-04-29 PROCEDURE — 93005 ELECTROCARDIOGRAM TRACING: CPT | Performed by: STUDENT IN AN ORGANIZED HEALTH CARE EDUCATION/TRAINING PROGRAM

## 2024-04-29 PROCEDURE — 93005 ELECTROCARDIOGRAM TRACING: CPT

## 2024-04-29 PROCEDURE — 36415 COLL VENOUS BLD VENIPUNCTURE: CPT

## 2024-04-29 PROCEDURE — 99284 EMERGENCY DEPT VISIT MOD MDM: CPT

## 2024-04-29 PROCEDURE — A9270 NON-COVERED ITEM OR SERVICE: HCPCS | Performed by: STUDENT IN AN ORGANIZED HEALTH CARE EDUCATION/TRAINING PROGRAM

## 2024-04-29 PROCEDURE — 85025 COMPLETE CBC W/AUTO DIFF WBC: CPT

## 2024-04-29 PROCEDURE — 80053 COMPREHEN METABOLIC PANEL: CPT

## 2024-04-29 PROCEDURE — 87651 STREP A DNA AMP PROBE: CPT

## 2024-04-29 PROCEDURE — 700117 HCHG RX CONTRAST REV CODE 255: Performed by: STUDENT IN AN ORGANIZED HEALTH CARE EDUCATION/TRAINING PROGRAM

## 2024-04-29 PROCEDURE — 700102 HCHG RX REV CODE 250 W/ 637 OVERRIDE(OP): Performed by: STUDENT IN AN ORGANIZED HEALTH CARE EDUCATION/TRAINING PROGRAM

## 2024-04-29 PROCEDURE — 70487 CT MAXILLOFACIAL W/DYE: CPT

## 2024-04-29 RX ORDER — AMOXICILLIN AND CLAVULANATE POTASSIUM 875; 125 MG/1; MG/1
1 TABLET, FILM COATED ORAL 2 TIMES DAILY
Qty: 20 TABLET | Refills: 0 | Status: ACTIVE | OUTPATIENT
Start: 2024-04-29 | End: 2024-05-09

## 2024-04-29 RX ORDER — AMOXICILLIN AND CLAVULANATE POTASSIUM 875; 125 MG/1; MG/1
1 TABLET, FILM COATED ORAL ONCE
Status: COMPLETED | OUTPATIENT
Start: 2024-04-29 | End: 2024-04-29

## 2024-04-29 RX ADMIN — IOHEXOL 80 ML: 350 INJECTION, SOLUTION INTRAVENOUS at 19:42

## 2024-04-29 RX ADMIN — AMOXICILLIN AND CLAVULANATE POTASSIUM 1 TABLET: 875; 125 TABLET, FILM COATED ORAL at 20:22

## 2024-04-29 ASSESSMENT — FIBROSIS 4 INDEX: FIB4 SCORE: 2.03

## 2024-04-29 NOTE — ED TRIAGE NOTES
Chief Complaint   Patient presents with    Shortness of Breath     X one month     Nasal Congestion     X one month    Sore Throat     X 3 days      Patient was seen last week at [facility] for a possible infection. Patient reports currently on prednisone 40 mg x 5 days, last dose today. Patient also was on Moxifloxacin last dose was last week.

## 2024-04-30 LAB — EKG IMPRESSION: NORMAL

## 2024-04-30 NOTE — DISCHARGE INSTRUCTIONS
You were seen for concern for sinusitis. It does appear that you have sinus inflammation that may have a chronic component. We will place you on a course of Augmentin for 10 days. I recommend you also take a Zyrtec. Follow up with Dr. Strickland and ENT. Return to the ER for severe headache or any other concerns.     HISTORY/REASON FOR EXAM:  evaluate for sinusitis, rhinorrhea x 1 month.        TECHNIQUE/EXAM DESCRIPTION AND NUMBER OF VIEWS:  CT scan of the maxillofacial with contrast, with reconstructions.     Thin-section helical imaging was obtained of the maxillofacial structures from the orbital roofs through the mandible. Coronal and sagittal multiplanar volume reformat images were generated from the axial data.,     80 mL of Omnipaque 350 nonionic contrast was injected intravenously.     Low dose optimization technique was utilized for this CT exam including automated exposure control and adjustment of the mA and/or kV according to patient size.     COMPARISON:  None.     FINDINGS:  There is mucosal thickening in bilateral maxillary sinuses with fluid in bilateral maxillary sinuses. There is mucosal thickening in the ethmoid air cells and frontal sinuses. There is minimal mucosal thickening in the sphenoid sinuses.     The mastoid air cells are well aerated.     IMPRESSION:     1.  Acute on chronic sinusitis.

## 2024-04-30 NOTE — ED NOTES
Pt provided discharge instructions, and prescription. Pt verbalized understanding of all instructions. IV removed, cathlon intact, site without s/s of infection. Pt ambulatory to lobby.

## 2024-04-30 NOTE — ED PROVIDER NOTES
ED Provider Note    CHIEF COMPLAINT  Chief Complaint   Patient presents with    Shortness of Breath     X one month     Nasal Congestion     X one month    Sore Throat     X 3 days        EXTERNAL RECORDS REVIEWED  Outpatient Notes Patient went to urgent care 04/16/2024 for viral URI with cough and was prescribed prednisone and albuterol    HPI/ROS  LIMITATION TO HISTORY   Select: : None  OUTSIDE HISTORIAN(S):  None    Linda Sinha is a 73 y.o. female who presents for evaluation of nasal congestion which has been ongoing for the past months.  She is having yellow nasal discharge.  Last week she had a temperature of approximately 101.  She feels like she is having some dripping down her throat.  She is currently on prednisone due to some shortness of breath but states that this is improving and does not want chest x-ray.  She is having some swollen lymph nodes under her right chin.  She denies any severe headache.  She has no nausea, vomiting.  She is requesting to have a CT scan of the face done as she has follow-up with her primary care doctor tomorrow.  She did take a course of moxifloxacin but finished last week.  She feels like she did get somewhat better on the antibiotic. She has follow up with her PCP tomorrow but wanted to get a CT scan of her face to make sure there isn't anything emergent.     PAST MEDICAL HISTORY   has a past medical history of Chronic back pain, COVID, and GERD (gastroesophageal reflux disease).    SURGICAL HISTORY   has a past surgical history that includes primary c section and breast reconstruction.    FAMILY HISTORY  History reviewed. No pertinent family history.    SOCIAL HISTORY  Social History     Tobacco Use    Smoking status: Never    Smokeless tobacco: Never   Vaping Use    Vaping Use: Never used   Substance and Sexual Activity    Alcohol use: Not Currently    Drug use: Never    Sexual activity: Not on file       CURRENT MEDICATIONS  Home Medications       Reviewed by  "Radha Olvera R.N. (Registered Nurse) on 04/29/24 at 1514  Med List Status: Partial     Medication Last Dose Status   albuterol 108 (90 Base) MCG/ACT Aero Soln inhalation aerosol  Active   albuterol 108 (90 Base) MCG/ACT Aero Soln inhalation aerosol  Active   Albuterol Sulfate (PROAIR HFA INH)  Active   ASPIRIN 81 PO  Active   Non Formulary Request  Active   Nutritional Supplements (DHEA PO)  Active   predniSONE (DELTASONE) 20 MG Tab  Active   Progesterone Micronized (PROGESTERONE PO)  Active                  ALLERGIES  Allergies   Allergen Reactions    Botox [Botulinum Toxin Type A, Cosm] Anaphylaxis     Acid reflux     PHYSICAL EXAM  VITAL SIGNS: BP (!) 191/81   Pulse 83   Temp 36.4 °C (97.6 °F) (Temporal)   Resp 18   Ht 1.651 m (5' 5\")   Wt 64.3 kg (141 lb 12.1 oz)   SpO2 95%   BMI 23.59 kg/m²      Constitutional: Well developed, Well nourished, No acute respiratory distress, Non-toxic appearance.   HENT: Normocephalic, Atraumatic, Bilateral external ears normal, Oropharynx mildly erythematous however no uvular deviation, mucous membranes are moist.  No purulent nasal discharge noted  Eyes: Conjunctiva normal, No discharge. No icterus.  Neck: Normal range of motion. Supple.  Lymphatic: Tender right submandibular lymphadenopathy but otherwise no LAD  Cardiovascular: Normal heart rate, Normal rhythm, No murmurs, No rubs, No gallops.   Thorax & Lungs: Clear to auscultation bilaterally, No respiratory distress, No wheezing.  Abdomen: Soft nontender normal bowel sounds no rebound masses or peritoneal signs  Skin: Warm, Dry, No erythema, No rash.   Extremities: Intact distal pulses, No edema, No tenderness  Musculoskeletal: Good range of motion in all major joints. Normal gait.  Neurologic: Alert & oriented. No focal deficits noted. CN II-XII intact, strength 5/5 to bilateral upper and lower extremities, sensation intact to light touch  Psych: Alert normal affect       EKG/LABS  Results for orders placed or " performed during the hospital encounter of 04/29/24   CBC with Differential   Result Value Ref Range    WBC 8.0 4.8 - 10.8 K/uL    RBC 4.47 4.20 - 5.40 M/uL    Hemoglobin 13.6 12.0 - 16.0 g/dL    Hematocrit 41.0 37.0 - 47.0 %    MCV 91.7 81.4 - 97.8 fL    MCH 30.4 27.0 - 33.0 pg    MCHC 33.2 32.2 - 35.5 g/dL    RDW 44.8 35.9 - 50.0 fL    Platelet Count 209 164 - 446 K/uL    MPV 11.3 9.0 - 12.9 fL    Neutrophils-Polys 62.30 44.00 - 72.00 %    Lymphocytes 25.70 22.00 - 41.00 %    Monocytes 10.20 0.00 - 13.40 %    Eosinophils 1.10 0.00 - 6.90 %    Basophils 0.40 0.00 - 1.80 %    Immature Granulocytes 0.30 0.00 - 0.90 %    Nucleated RBC 0.00 0.00 - 0.20 /100 WBC    Neutrophils (Absolute) 4.97 1.82 - 7.42 K/uL    Lymphs (Absolute) 2.05 1.00 - 4.80 K/uL    Monos (Absolute) 0.81 0.00 - 0.85 K/uL    Eos (Absolute) 0.09 0.00 - 0.51 K/uL    Baso (Absolute) 0.03 0.00 - 0.12 K/uL    Immature Granulocytes (abs) 0.02 0.00 - 0.11 K/uL    NRBC (Absolute) 0.00 K/uL   Comp Metabolic Panel   Result Value Ref Range    Sodium 139 135 - 145 mmol/L    Potassium 4.5 3.6 - 5.5 mmol/L    Chloride 101 96 - 112 mmol/L    Co2 22 20 - 33 mmol/L    Anion Gap 16.0 7.0 - 16.0    Glucose 89 65 - 99 mg/dL    Bun 23 (H) 8 - 22 mg/dL    Creatinine 0.83 0.50 - 1.40 mg/dL    Calcium 8.7 8.5 - 10.5 mg/dL    Correct Calcium 8.8 8.5 - 10.5 mg/dL    AST(SGOT) 42 12 - 45 U/L    ALT(SGPT) 28 2 - 50 U/L    Alkaline Phosphatase 69 30 - 99 U/L    Total Bilirubin 0.2 0.1 - 1.5 mg/dL    Albumin 3.9 3.2 - 4.9 g/dL    Total Protein 6.6 6.0 - 8.2 g/dL    Globulin 2.7 1.9 - 3.5 g/dL    A-G Ratio 1.4 g/dL   ESTIMATED GFR   Result Value Ref Range    GFR (CKD-EPI) 75 >60 mL/min/1.73 m 2   Group A Strep by PCR    Specimen: Throat   Result Value Ref Range    Group A Strep by PCR Not Detected Not Detected   EKG   Result Value Ref Range    Report       [facility] Emergency Dept.    Test Date:  2024-04-29  Pt Name:    FELICE SUE              Department: ER  MRN:        [id number]                      Room:  Gender:     Female                       Technician: [id number]  :        1950                   Requested By:ER TRIAGE PROTOCOL  Order #:    [id number]                    Reading MD: Soni Arguello    Measurements  Intervals                                Axis  Rate:       79                           P:          79  KS:         177                          QRS:        68  QRSD:       91                           T:          63  QT:         372  QTc:        427    Interpretive Statements  Sinus rhythm  Normal axis  Normal intervals  No ST or T wave changes  Compared to ECG 2022 01:22:53    Electronically Signed On 2024 00:29:54 PDT by Soni Arguello         I have independently interpreted this EKG    RADIOLOGY/PROCEDURES   I have independently interpreted the diagnostic imaging associated with this visit and am waiting the final reading from the radiologist.   My preliminary interpretation is as follows: no abscess    Radiologist interpretation:  CT-MAXILLOFACIAL WITH PLUS RECONS   Final Result      1.  Acute on chronic sinusitis.          COURSE & MEDICAL DECISION MAKING    ASSESSMENT, COURSE AND PLAN  Care Narrative:   This is a 73-year-old female who presents for evaluation of 1 month history of nasal congestion.  She states that she has had 3 separate infections.  She has received a course of moxifloxacin within this past month and states that she did get better after the antibiotics but now is having nasal discharge again.  She had a fever last week.  She was recently put on prednisone for some wheezing however states that from the standpoint she is feeling much better.  On arrival, her vital signs are stable.  She is nontoxic in appearance.  She has a nonfocal neuroexam.  There is no purulent discharge coming from her nose.  Patient's main goal is to get a CT scan of her face and follow-up with her PCP tomorrow who does a lot  of alternative therapies.    Labs obtained are reassuring.  There is no leukocytosis.  She does have some tender lymph nodes underneath her right submandibular area but there is no crepitus.  I did consider strep throat given this and her posterior oropharynx is slightly erythematous.  Strep test is negative.  CT scan of the face done and shows acute on chronic sinusitis.  There is no evidence of Noel's angina.  Results were discussed with patient.  I do think it be reasonable placed on a 10-day course of Augmentin with first dose given here.  She has follow-up with her PCP tomorrow and has plans to follow-up with ENT however cannot get in until July.  I have advised her to consider taking Zyrtec as well in case there is any allergic component to this.  She is advised to come back if she has any worsening symptoms, persistent fever, any other concerns.  She scribbled discharge plan no further questions.            ADDITIONAL PROBLEMS MANAGED  None    DISPOSITION AND DISCUSSIONS  I have discussed management of the patient with the following physicians and DANIELLE's:  None    Discussion of management with other QHP or appropriate source(s): None     Escalation of care considered, and ultimately not performed:acute inpatient care management, however at this time, the patient is most appropriate for outpatient management    Barriers to care at this time, including but not limited to:  None .     Decision tools and prescription drugs considered including, but not limited to: Antibiotics augmentin .    The patient will return for new or worsening symptoms and is stable at the time of discharge.    The patient is referred to a primary physician for blood pressure management, diabetic screening, and for all other preventative health concerns.    DISPOSITION:  Patient will be discharged home in stable condition.    FOLLOW UP:  Deon Strickland D.O.  [address] [place] [address] [place] [id number] [phone]          [facility]  Center, Emergency Dept  [facility] [facility] [address]  [phone]          OUTPATIENT MEDICATIONS:  Discharge Medication List as of 4/29/2024  8:27 PM        START taking these medications    Details   amoxicillin-clavulanate (AUGMENTIN) 875-125 MG Tab Take 1 Tablet by mouth 2 times a day for 10 days., Disp-20 Tablet, R-0, Normal               FINAL DIAGNOSIS  1. Sinusitis, unspecified chronicity, unspecified location           Electronically signed by: Soni Arguello M.D., 4/29/2024 5:06 PM